# Patient Record
Sex: FEMALE | Race: WHITE | Employment: PART TIME | URBAN - METROPOLITAN AREA
[De-identification: names, ages, dates, MRNs, and addresses within clinical notes are randomized per-mention and may not be internally consistent; named-entity substitution may affect disease eponyms.]

---

## 2017-02-13 DIAGNOSIS — F51.01 PRIMARY INSOMNIA: ICD-10-CM

## 2017-02-13 RX ORDER — ZOLPIDEM TARTRATE 10 MG/1
TABLET ORAL
Qty: 30 TABLET | Refills: 0 | OUTPATIENT
Start: 2017-02-13 | End: 2017-03-17 | Stop reason: SDUPTHER

## 2017-03-17 ENCOUNTER — OFFICE VISIT (OUTPATIENT)
Dept: FAMILY MEDICINE CLINIC | Age: 62
End: 2017-03-17

## 2017-03-17 VITALS
HEART RATE: 90 BPM | SYSTOLIC BLOOD PRESSURE: 132 MMHG | TEMPERATURE: 97.7 F | WEIGHT: 139 LBS | DIASTOLIC BLOOD PRESSURE: 86 MMHG | BODY MASS INDEX: 24.63 KG/M2 | HEIGHT: 63 IN

## 2017-03-17 DIAGNOSIS — F51.01 PRIMARY INSOMNIA: ICD-10-CM

## 2017-03-17 DIAGNOSIS — I10 ESSENTIAL HYPERTENSION: Primary | ICD-10-CM

## 2017-03-17 DIAGNOSIS — G43.109 MIGRAINE WITH AURA AND WITHOUT STATUS MIGRAINOSUS, NOT INTRACTABLE: ICD-10-CM

## 2017-03-17 DIAGNOSIS — B00.9 HSV INFECTION: ICD-10-CM

## 2017-03-17 DIAGNOSIS — Z79.899 ENCOUNTER FOR LONG-TERM (CURRENT) USE OF MEDICATIONS: ICD-10-CM

## 2017-03-17 PROCEDURE — G8420 CALC BMI NORM PARAMETERS: HCPCS | Performed by: FAMILY MEDICINE

## 2017-03-17 PROCEDURE — 3017F COLORECTAL CA SCREEN DOC REV: CPT | Performed by: FAMILY MEDICINE

## 2017-03-17 PROCEDURE — G8484 FLU IMMUNIZE NO ADMIN: HCPCS | Performed by: FAMILY MEDICINE

## 2017-03-17 PROCEDURE — G8427 DOCREV CUR MEDS BY ELIG CLIN: HCPCS | Performed by: FAMILY MEDICINE

## 2017-03-17 PROCEDURE — 3014F SCREEN MAMMO DOC REV: CPT | Performed by: FAMILY MEDICINE

## 2017-03-17 PROCEDURE — 99214 OFFICE O/P EST MOD 30 MIN: CPT | Performed by: FAMILY MEDICINE

## 2017-03-17 PROCEDURE — 1036F TOBACCO NON-USER: CPT | Performed by: FAMILY MEDICINE

## 2017-03-17 RX ORDER — LISINOPRIL 10 MG/1
10 TABLET ORAL DAILY
Qty: 90 TABLET | Refills: 1 | Status: SHIPPED | OUTPATIENT
Start: 2017-03-17 | End: 2017-10-09 | Stop reason: SDUPTHER

## 2017-03-17 RX ORDER — ACYCLOVIR 400 MG/1
TABLET ORAL
Qty: 90 TABLET | Refills: 0 | Status: SHIPPED | OUTPATIENT
Start: 2017-03-17 | End: 2018-12-06 | Stop reason: SDUPTHER

## 2017-03-17 RX ORDER — ZOLPIDEM TARTRATE 10 MG/1
TABLET ORAL
Qty: 30 TABLET | Refills: 3 | Status: SHIPPED | OUTPATIENT
Start: 2017-03-17 | End: 2017-10-31 | Stop reason: ALTCHOICE

## 2017-03-17 RX ORDER — SUMATRIPTAN 100 MG/1
TABLET, FILM COATED ORAL
Qty: 27 TABLET | Refills: 5 | Status: SHIPPED | OUTPATIENT
Start: 2017-03-17 | End: 2017-10-31 | Stop reason: SDUPTHER

## 2017-10-09 ENCOUNTER — TELEPHONE (OUTPATIENT)
Dept: FAMILY MEDICINE CLINIC | Age: 62
End: 2017-10-09

## 2017-10-09 DIAGNOSIS — I10 ESSENTIAL HYPERTENSION: ICD-10-CM

## 2017-10-09 RX ORDER — LISINOPRIL 10 MG/1
TABLET ORAL
Qty: 30 TABLET | Refills: 0 | Status: SHIPPED | OUTPATIENT
Start: 2017-10-09 | End: 2017-10-31 | Stop reason: SDUPTHER

## 2017-10-09 NOTE — TELEPHONE ENCOUNTER
Last office visit 3/17/17    Last written 3/17/17    Next office visit scheduled Visit date not found    Requested Prescriptions     Pending Prescriptions Disp Refills    lisinopril (PRINIVIL;ZESTRIL) 10 MG tablet [Pharmacy Med Name: LISINOPRIL 10 MG TABLET] 30 tablet 0     Sig: TAKE ONE TABLET BY MOUTH DAILY

## 2017-10-12 ENCOUNTER — NURSE ONLY (OUTPATIENT)
Dept: FAMILY MEDICINE CLINIC | Age: 62
End: 2017-10-12

## 2017-10-12 DIAGNOSIS — I10 ESSENTIAL HYPERTENSION: ICD-10-CM

## 2017-10-12 DIAGNOSIS — I10 ESSENTIAL HYPERTENSION: Primary | ICD-10-CM

## 2017-10-12 LAB
A/G RATIO: 1.7 (ref 1.1–2.2)
ALBUMIN SERPL-MCNC: 4.4 G/DL (ref 3.4–5)
ALP BLD-CCNC: 81 U/L (ref 40–129)
ALT SERPL-CCNC: 24 U/L (ref 10–40)
ANION GAP SERPL CALCULATED.3IONS-SCNC: 15 MMOL/L (ref 3–16)
AST SERPL-CCNC: 30 U/L (ref 15–37)
BILIRUB SERPL-MCNC: 0.4 MG/DL (ref 0–1)
BUN BLDV-MCNC: 23 MG/DL (ref 7–20)
CALCIUM SERPL-MCNC: 9.7 MG/DL (ref 8.3–10.6)
CHLORIDE BLD-SCNC: 102 MMOL/L (ref 99–110)
CO2: 24 MMOL/L (ref 21–32)
CREAT SERPL-MCNC: 0.7 MG/DL (ref 0.6–1.2)
GFR AFRICAN AMERICAN: >60
GFR NON-AFRICAN AMERICAN: >60
GLOBULIN: 2.6 G/DL
GLUCOSE BLD-MCNC: 88 MG/DL (ref 70–99)
POTASSIUM SERPL-SCNC: 4.4 MMOL/L (ref 3.5–5.1)
SODIUM BLD-SCNC: 141 MMOL/L (ref 136–145)
TOTAL PROTEIN: 7 G/DL (ref 6.4–8.2)

## 2017-10-12 PROCEDURE — 36415 COLL VENOUS BLD VENIPUNCTURE: CPT | Performed by: FAMILY MEDICINE

## 2017-10-13 LAB
CHOLESTEROL, TOTAL: 208 MG/DL (ref 0–199)
ESTIMATED AVERAGE GLUCOSE: 99.7 MG/DL
HBA1C MFR BLD: 5.1 %
HDLC SERPL-MCNC: 68 MG/DL (ref 40–60)
LDL CHOLESTEROL CALCULATED: 121 MG/DL
TRIGL SERPL-MCNC: 96 MG/DL (ref 0–150)
VLDLC SERPL CALC-MCNC: 19 MG/DL

## 2017-10-31 ENCOUNTER — OFFICE VISIT (OUTPATIENT)
Dept: FAMILY MEDICINE CLINIC | Age: 62
End: 2017-10-31

## 2017-10-31 VITALS
HEIGHT: 63 IN | BODY MASS INDEX: 25.69 KG/M2 | DIASTOLIC BLOOD PRESSURE: 82 MMHG | SYSTOLIC BLOOD PRESSURE: 126 MMHG | OXYGEN SATURATION: 98 % | WEIGHT: 145 LBS | HEART RATE: 75 BPM

## 2017-10-31 DIAGNOSIS — I10 ESSENTIAL HYPERTENSION: Primary | ICD-10-CM

## 2017-10-31 DIAGNOSIS — Z00.00 PREVENTATIVE HEALTH CARE: ICD-10-CM

## 2017-10-31 DIAGNOSIS — G43.109 MIGRAINE WITH AURA AND WITHOUT STATUS MIGRAINOSUS, NOT INTRACTABLE: ICD-10-CM

## 2017-10-31 DIAGNOSIS — E78.01 LDL (LOW DENSITY LIPOPROTEIN RECEPTOR DISORDER): ICD-10-CM

## 2017-10-31 PROCEDURE — G8484 FLU IMMUNIZE NO ADMIN: HCPCS | Performed by: FAMILY MEDICINE

## 2017-10-31 PROCEDURE — 3014F SCREEN MAMMO DOC REV: CPT | Performed by: FAMILY MEDICINE

## 2017-10-31 PROCEDURE — 1036F TOBACCO NON-USER: CPT | Performed by: FAMILY MEDICINE

## 2017-10-31 PROCEDURE — G8419 CALC BMI OUT NRM PARAM NOF/U: HCPCS | Performed by: FAMILY MEDICINE

## 2017-10-31 PROCEDURE — 3017F COLORECTAL CA SCREEN DOC REV: CPT | Performed by: FAMILY MEDICINE

## 2017-10-31 PROCEDURE — G8427 DOCREV CUR MEDS BY ELIG CLIN: HCPCS | Performed by: FAMILY MEDICINE

## 2017-10-31 PROCEDURE — 99214 OFFICE O/P EST MOD 30 MIN: CPT | Performed by: FAMILY MEDICINE

## 2017-10-31 RX ORDER — LISINOPRIL 10 MG/1
TABLET ORAL
Qty: 30 TABLET | Refills: 4 | Status: SHIPPED | OUTPATIENT
Start: 2017-10-31 | End: 2018-04-14 | Stop reason: SDUPTHER

## 2017-10-31 RX ORDER — SUMATRIPTAN 100 MG/1
TABLET, FILM COATED ORAL
Qty: 9 TABLET | Refills: 5 | Status: SHIPPED | OUTPATIENT
Start: 2017-10-31 | End: 2018-06-18 | Stop reason: SDUPTHER

## 2017-10-31 RX ORDER — LANOLIN ALCOHOL/MO/W.PET/CERES
3 CREAM (GRAM) TOPICAL DAILY
COMMUNITY

## 2018-04-14 DIAGNOSIS — I10 ESSENTIAL HYPERTENSION: ICD-10-CM

## 2018-04-18 RX ORDER — LISINOPRIL 10 MG/1
TABLET ORAL
Qty: 30 TABLET | Refills: 0 | Status: SHIPPED | OUTPATIENT
Start: 2018-04-18 | End: 2018-05-18 | Stop reason: SDUPTHER

## 2018-06-07 ENCOUNTER — OFFICE VISIT (OUTPATIENT)
Dept: FAMILY MEDICINE CLINIC | Age: 63
End: 2018-06-07

## 2018-06-07 VITALS
OXYGEN SATURATION: 96 % | WEIGHT: 151 LBS | BODY MASS INDEX: 26.75 KG/M2 | HEART RATE: 86 BPM | DIASTOLIC BLOOD PRESSURE: 84 MMHG | SYSTOLIC BLOOD PRESSURE: 134 MMHG

## 2018-06-07 DIAGNOSIS — G43.109 MIGRAINE WITH AURA AND WITHOUT STATUS MIGRAINOSUS, NOT INTRACTABLE: ICD-10-CM

## 2018-06-07 DIAGNOSIS — I10 ESSENTIAL HYPERTENSION: Primary | ICD-10-CM

## 2018-06-07 DIAGNOSIS — F51.01 PRIMARY INSOMNIA: ICD-10-CM

## 2018-06-07 DIAGNOSIS — Z12.11 COLON CANCER SCREENING: ICD-10-CM

## 2018-06-07 DIAGNOSIS — Z12.39 BREAST CANCER SCREENING: ICD-10-CM

## 2018-06-07 PROCEDURE — 99214 OFFICE O/P EST MOD 30 MIN: CPT | Performed by: FAMILY MEDICINE

## 2018-06-07 PROCEDURE — 3017F COLORECTAL CA SCREEN DOC REV: CPT | Performed by: FAMILY MEDICINE

## 2018-06-07 PROCEDURE — G8419 CALC BMI OUT NRM PARAM NOF/U: HCPCS | Performed by: FAMILY MEDICINE

## 2018-06-07 PROCEDURE — G8427 DOCREV CUR MEDS BY ELIG CLIN: HCPCS | Performed by: FAMILY MEDICINE

## 2018-06-07 PROCEDURE — 1036F TOBACCO NON-USER: CPT | Performed by: FAMILY MEDICINE

## 2018-06-07 RX ORDER — LISINOPRIL 10 MG/1
TABLET ORAL
Qty: 30 TABLET | Refills: 2 | Status: SHIPPED | OUTPATIENT
Start: 2018-06-07 | End: 2018-09-17 | Stop reason: SDUPTHER

## 2018-06-07 ASSESSMENT — ENCOUNTER SYMPTOMS: SHORTNESS OF BREATH: 0

## 2018-06-18 DIAGNOSIS — G43.109 MIGRAINE WITH AURA AND WITHOUT STATUS MIGRAINOSUS, NOT INTRACTABLE: ICD-10-CM

## 2018-06-19 RX ORDER — SUMATRIPTAN 100 MG/1
TABLET, FILM COATED ORAL
Qty: 9 TABLET | Refills: 5 | Status: SHIPPED | OUTPATIENT
Start: 2018-06-19 | End: 2020-01-27

## 2018-08-08 ENCOUNTER — TELEPHONE (OUTPATIENT)
Dept: FAMILY MEDICINE CLINIC | Age: 63
End: 2018-08-08

## 2018-08-09 ENCOUNTER — TELEPHONE (OUTPATIENT)
Dept: FAMILY MEDICINE CLINIC | Age: 63
End: 2018-08-09

## 2018-08-27 ENCOUNTER — TELEPHONE (OUTPATIENT)
Dept: FAMILY MEDICINE CLINIC | Age: 63
End: 2018-08-27

## 2018-08-27 NOTE — TELEPHONE ENCOUNTER
Patient filled out a Cologuard Order form to be filled out by provider and faxed to [employer] . She states her insurance will pay for the test and she prefers to do this instead of the Fit Test. Patient would like to be informed if form was able to be filled out and faxed in.  Form was placed in Dr Micheal Guerrero folder

## 2018-08-31 ENCOUNTER — TELEPHONE (OUTPATIENT)
Dept: FAMILY MEDICINE CLINIC | Age: 63
End: 2018-08-31

## 2018-09-10 ENCOUNTER — TELEPHONE (OUTPATIENT)
Dept: FAMILY MEDICINE CLINIC | Age: 63
End: 2018-09-10

## 2018-09-13 ENCOUNTER — TELEPHONE (OUTPATIENT)
Dept: FAMILY MEDICINE CLINIC | Age: 63
End: 2018-09-13

## 2018-09-14 NOTE — TELEPHONE ENCOUNTER
Left detailed message letting her know the test was negative, and to call for any concerns or questions.

## 2018-09-17 DIAGNOSIS — I10 ESSENTIAL HYPERTENSION: ICD-10-CM

## 2018-09-17 RX ORDER — LISINOPRIL 10 MG/1
TABLET ORAL
Qty: 30 TABLET | Refills: 1 | Status: SHIPPED | OUTPATIENT
Start: 2018-09-17 | End: 2018-11-12 | Stop reason: SDUPTHER

## 2018-09-17 NOTE — TELEPHONE ENCOUNTER
Patient called and stated that her pharmacy would be sending over a refill request for her Lisinopril 10 mgs. Patient states that she is completely out of the medication and does not having any for todays dose. Patient states that she is also leaving tomorrow to go out of town and will be gone until Friday.     Last filled   06.07.18  Last seen  06.07.18  Next appt   Not scheduled

## 2018-10-08 ENCOUNTER — HOSPITAL ENCOUNTER (OUTPATIENT)
Dept: WOMENS IMAGING | Age: 63
Discharge: HOME | End: 2018-10-08
Payer: COMMERCIAL

## 2018-10-08 DIAGNOSIS — Z12.31 VISIT FOR SCREENING MAMMOGRAM: ICD-10-CM

## 2018-10-08 PROCEDURE — 77063 BREAST TOMOSYNTHESIS BI: CPT

## 2018-10-15 ENCOUNTER — OFFICE VISIT (OUTPATIENT)
Dept: FAMILY MEDICINE CLINIC | Age: 63
End: 2018-10-15
Payer: COMMERCIAL

## 2018-10-15 VITALS
HEART RATE: 68 BPM | WEIGHT: 150 LBS | BODY MASS INDEX: 26.57 KG/M2 | SYSTOLIC BLOOD PRESSURE: 138 MMHG | TEMPERATURE: 98 F | DIASTOLIC BLOOD PRESSURE: 80 MMHG

## 2018-10-15 DIAGNOSIS — H01.116 EYELID DERMATITIS, ALLERGIC/CONTACT, LEFT: Primary | ICD-10-CM

## 2018-10-15 PROCEDURE — 99213 OFFICE O/P EST LOW 20 MIN: CPT | Performed by: NURSE PRACTITIONER

## 2018-10-15 ASSESSMENT — ENCOUNTER SYMPTOMS
COLOR CHANGE: 1
PHOTOPHOBIA: 0
EYE ITCHING: 1
EYE PAIN: 0
RESPIRATORY NEGATIVE: 1
EYE DISCHARGE: 1
EYE REDNESS: 0

## 2018-10-15 NOTE — PATIENT INSTRUCTIONS
Use allergy eye drops and you can try saline solution for the eyes. Try 0.5 %hydrocortisone cream on top the eye lid in a thin layer. Also use daily moisture cream on face and eye lids.

## 2018-10-26 ENCOUNTER — OFFICE VISIT (OUTPATIENT)
Dept: FAMILY MEDICINE CLINIC | Age: 63
End: 2018-10-26
Payer: COMMERCIAL

## 2018-10-26 VITALS
SYSTOLIC BLOOD PRESSURE: 128 MMHG | OXYGEN SATURATION: 97 % | WEIGHT: 150 LBS | DIASTOLIC BLOOD PRESSURE: 80 MMHG | BODY MASS INDEX: 26.57 KG/M2 | HEART RATE: 88 BPM

## 2018-10-26 DIAGNOSIS — H01.004 BLEPHARITIS OF LEFT UPPER EYELID, UNSPECIFIED TYPE: Primary | ICD-10-CM

## 2018-10-26 PROCEDURE — 3017F COLORECTAL CA SCREEN DOC REV: CPT | Performed by: NURSE PRACTITIONER

## 2018-10-26 PROCEDURE — G8427 DOCREV CUR MEDS BY ELIG CLIN: HCPCS | Performed by: NURSE PRACTITIONER

## 2018-10-26 PROCEDURE — G8419 CALC BMI OUT NRM PARAM NOF/U: HCPCS | Performed by: NURSE PRACTITIONER

## 2018-10-26 PROCEDURE — 1036F TOBACCO NON-USER: CPT | Performed by: NURSE PRACTITIONER

## 2018-10-26 PROCEDURE — 99213 OFFICE O/P EST LOW 20 MIN: CPT | Performed by: NURSE PRACTITIONER

## 2018-10-26 PROCEDURE — G8484 FLU IMMUNIZE NO ADMIN: HCPCS | Performed by: NURSE PRACTITIONER

## 2018-10-26 ASSESSMENT — ENCOUNTER SYMPTOMS
EYE DISCHARGE: 1
BLURRED VISION: 1
DOUBLE VISION: 0
EYE ITCHING: 1
NAUSEA: 0
EYE REDNESS: 1

## 2018-11-12 DIAGNOSIS — I10 ESSENTIAL HYPERTENSION: ICD-10-CM

## 2018-11-12 RX ORDER — LISINOPRIL 10 MG/1
TABLET ORAL
Qty: 30 TABLET | Refills: 0 | Status: SHIPPED | OUTPATIENT
Start: 2018-11-12 | End: 2018-12-06 | Stop reason: SDUPTHER

## 2018-12-06 ENCOUNTER — OFFICE VISIT (OUTPATIENT)
Dept: FAMILY MEDICINE CLINIC | Age: 63
End: 2018-12-06
Payer: COMMERCIAL

## 2018-12-06 VITALS
DIASTOLIC BLOOD PRESSURE: 80 MMHG | SYSTOLIC BLOOD PRESSURE: 126 MMHG | WEIGHT: 149 LBS | HEART RATE: 68 BPM | BODY MASS INDEX: 26.39 KG/M2 | OXYGEN SATURATION: 98 %

## 2018-12-06 DIAGNOSIS — I10 ESSENTIAL HYPERTENSION: ICD-10-CM

## 2018-12-06 DIAGNOSIS — Z87.891 FORMER SMOKER: ICD-10-CM

## 2018-12-06 DIAGNOSIS — N39.0 CHRONIC UTI (URINARY TRACT INFECTION): ICD-10-CM

## 2018-12-06 DIAGNOSIS — Z00.00 GENERAL MEDICAL EXAM: Primary | ICD-10-CM

## 2018-12-06 DIAGNOSIS — B00.9 HSV INFECTION: ICD-10-CM

## 2018-12-06 LAB
A/G RATIO: 2.1 (ref 1.1–2.2)
ALBUMIN SERPL-MCNC: 4.7 G/DL (ref 3.4–5)
ALP BLD-CCNC: 85 U/L (ref 40–129)
ALT SERPL-CCNC: 22 U/L (ref 10–40)
ANION GAP SERPL CALCULATED.3IONS-SCNC: 13 MMOL/L (ref 3–16)
AST SERPL-CCNC: 28 U/L (ref 15–37)
BASOPHILS ABSOLUTE: 0 K/UL (ref 0–0.2)
BASOPHILS RELATIVE PERCENT: 0.6 %
BILIRUB SERPL-MCNC: 0.6 MG/DL (ref 0–1)
BUN BLDV-MCNC: 15 MG/DL (ref 7–20)
CALCIUM SERPL-MCNC: 9.7 MG/DL (ref 8.3–10.6)
CHLORIDE BLD-SCNC: 102 MMOL/L (ref 99–110)
CHOLESTEROL, TOTAL: 200 MG/DL (ref 0–199)
CO2: 26 MMOL/L (ref 21–32)
CREAT SERPL-MCNC: 0.8 MG/DL (ref 0.6–1.2)
EOSINOPHILS ABSOLUTE: 0.2 K/UL (ref 0–0.6)
EOSINOPHILS RELATIVE PERCENT: 4.4 %
GFR AFRICAN AMERICAN: >60
GFR NON-AFRICAN AMERICAN: >60
GLOBULIN: 2.2 G/DL
GLUCOSE BLD-MCNC: 87 MG/DL (ref 70–99)
HCT VFR BLD CALC: 41.5 % (ref 36–48)
HDLC SERPL-MCNC: 66 MG/DL (ref 40–60)
HEMOGLOBIN: 14.1 G/DL (ref 12–16)
LDL CHOLESTEROL CALCULATED: 120 MG/DL
LYMPHOCYTES ABSOLUTE: 1.6 K/UL (ref 1–5.1)
LYMPHOCYTES RELATIVE PERCENT: 33.3 %
MCH RBC QN AUTO: 32.5 PG (ref 26–34)
MCHC RBC AUTO-ENTMCNC: 34 G/DL (ref 31–36)
MCV RBC AUTO: 95.5 FL (ref 80–100)
MONOCYTES ABSOLUTE: 0.4 K/UL (ref 0–1.3)
MONOCYTES RELATIVE PERCENT: 8.5 %
NEUTROPHILS ABSOLUTE: 2.5 K/UL (ref 1.7–7.7)
NEUTROPHILS RELATIVE PERCENT: 53.2 %
PDW BLD-RTO: 12.9 % (ref 12.4–15.4)
PLATELET # BLD: 246 K/UL (ref 135–450)
PMV BLD AUTO: 8.4 FL (ref 5–10.5)
POTASSIUM SERPL-SCNC: 4.8 MMOL/L (ref 3.5–5.1)
RBC # BLD: 4.34 M/UL (ref 4–5.2)
SODIUM BLD-SCNC: 141 MMOL/L (ref 136–145)
TOTAL PROTEIN: 6.9 G/DL (ref 6.4–8.2)
TRIGL SERPL-MCNC: 70 MG/DL (ref 0–150)
TSH SERPL DL<=0.05 MIU/L-ACNC: 3.22 UIU/ML (ref 0.27–4.2)
VLDLC SERPL CALC-MCNC: 14 MG/DL
WBC # BLD: 4.7 K/UL (ref 4–11)

## 2018-12-06 PROCEDURE — 99396 PREV VISIT EST AGE 40-64: CPT | Performed by: NURSE PRACTITIONER

## 2018-12-06 PROCEDURE — G8484 FLU IMMUNIZE NO ADMIN: HCPCS | Performed by: NURSE PRACTITIONER

## 2018-12-06 PROCEDURE — 36415 COLL VENOUS BLD VENIPUNCTURE: CPT | Performed by: NURSE PRACTITIONER

## 2018-12-06 RX ORDER — NITROFURANTOIN 25; 75 MG/1; MG/1
CAPSULE ORAL
Qty: 30 CAPSULE | Refills: 0 | Status: SHIPPED | OUTPATIENT
Start: 2018-12-06 | End: 2020-04-15

## 2018-12-06 RX ORDER — LISINOPRIL 10 MG/1
10 TABLET ORAL DAILY
Qty: 30 TABLET | Refills: 4 | Status: SHIPPED | OUTPATIENT
Start: 2018-12-06 | End: 2019-04-29 | Stop reason: SDUPTHER

## 2018-12-06 RX ORDER — ACYCLOVIR 400 MG/1
TABLET ORAL
Qty: 90 TABLET | Refills: 0 | Status: SHIPPED | OUTPATIENT
Start: 2018-12-06 | End: 2022-05-13 | Stop reason: SDUPTHER

## 2018-12-06 ASSESSMENT — ENCOUNTER SYMPTOMS
NAUSEA: 0
EYE DISCHARGE: 0
BACK PAIN: 0
SINUS PAIN: 0
ABDOMINAL PAIN: 0
SHORTNESS OF BREATH: 0
CONSTIPATION: 0
SORE THROAT: 0
TROUBLE SWALLOWING: 0
SINUS PRESSURE: 0
DIARRHEA: 0
CHEST TIGHTNESS: 0
BLURRED VISION: 0
COUGH: 0
COLOR CHANGE: 0

## 2018-12-06 ASSESSMENT — PATIENT HEALTH QUESTIONNAIRE - PHQ9
SUM OF ALL RESPONSES TO PHQ QUESTIONS 1-9: 0
1. LITTLE INTEREST OR PLEASURE IN DOING THINGS: 0
SUM OF ALL RESPONSES TO PHQ QUESTIONS 1-9: 0
SUM OF ALL RESPONSES TO PHQ9 QUESTIONS 1 & 2: 0
2. FEELING DOWN, DEPRESSED OR HOPELESS: 0

## 2018-12-06 NOTE — PROGRESS NOTES
this note. --Manny Walker  [facility], FNP-BC on 12/6/2018 at9:06 AM
note.    --Lauren Garcia.  Fei,BEBETO-BC on 12/6/2018 at 9:23 AM

## 2018-12-07 LAB
ESTIMATED AVERAGE GLUCOSE: 99.7 MG/DL
HBA1C MFR BLD: 5.1 %

## 2018-12-17 ENCOUNTER — TELEPHONE (OUTPATIENT)
Dept: CASE MANAGEMENT | Age: 63
End: 2018-12-17

## 2018-12-28 ENCOUNTER — HOSPITAL ENCOUNTER (OUTPATIENT)
Dept: CT IMAGING | Age: 63
Discharge: HOME | End: 2018-12-28
Payer: COMMERCIAL

## 2018-12-28 DIAGNOSIS — Z87.891 FORMER SMOKER: ICD-10-CM

## 2018-12-28 PROCEDURE — G0297 LDCT FOR LUNG CA SCREEN: HCPCS

## 2019-04-29 DIAGNOSIS — I10 ESSENTIAL HYPERTENSION: ICD-10-CM

## 2019-04-29 RX ORDER — LISINOPRIL 10 MG/1
TABLET ORAL
Qty: 30 TABLET | Refills: 3 | Status: SHIPPED | OUTPATIENT
Start: 2019-04-29 | End: 2019-07-30 | Stop reason: SDUPTHER

## 2019-07-30 ENCOUNTER — OFFICE VISIT (OUTPATIENT)
Dept: FAMILY MEDICINE CLINIC | Age: 64
End: 2019-07-30

## 2019-07-30 VITALS
SYSTOLIC BLOOD PRESSURE: 132 MMHG | OXYGEN SATURATION: 100 % | WEIGHT: 154.8 LBS | DIASTOLIC BLOOD PRESSURE: 84 MMHG | TEMPERATURE: 98.8 F | HEART RATE: 74 BPM | BODY MASS INDEX: 27.42 KG/M2

## 2019-07-30 DIAGNOSIS — I10 ESSENTIAL HYPERTENSION: ICD-10-CM

## 2019-07-30 PROCEDURE — 99211 OFF/OP EST MAY X REQ PHY/QHP: CPT | Performed by: NURSE PRACTITIONER

## 2019-07-30 RX ORDER — LISINOPRIL 10 MG/1
TABLET ORAL
Qty: 90 TABLET | Refills: 1 | Status: SHIPPED | OUTPATIENT
Start: 2019-07-30 | End: 2020-02-24

## 2019-07-30 ASSESSMENT — ENCOUNTER SYMPTOMS
BACK PAIN: 0
COUGH: 0
SHORTNESS OF BREATH: 0
COLOR CHANGE: 0
SINUS PAIN: 0
TROUBLE SWALLOWING: 0
NAUSEA: 0
SORE THROAT: 0
CHEST TIGHTNESS: 0
EYE DISCHARGE: 0
SINUS PRESSURE: 0
ABDOMINAL PAIN: 0
BLURRED VISION: 0

## 2019-07-30 NOTE — PROGRESS NOTES
2019     Emily Mijares (:  1955) is a 62 y.o. female, here for evaluation of the following medical concerns: She is here today for medication refill of blood pressure medication. BP has been good at home. No headaches, no blurry vision.  twice a week. Going to [facility] in 2019. Hypertension   This is a chronic problem. The current episode started more than 1 year ago. The problem is unchanged. The problem is controlled. Pertinent negatives include no anxiety, blurred vision, chest pain, headaches, malaise/fatigue, palpitations, peripheral edema, shortness of breath or sweats. Treatments tried: lisinopril 10 mg. The current treatment provides significant improvement. There are no compliance problems. Review of Systems   Constitutional: Negative for activity change, appetite change, chills, diaphoresis, fatigue, fever and malaise/fatigue. HENT: Negative for congestion, ear pain, postnasal drip, sinus pressure, sinus pain, sore throat and trouble swallowing. Eyes: Negative for blurred vision and discharge. Respiratory: Negative for cough, chest tightness and shortness of breath. Cardiovascular: Negative for chest pain and palpitations. Gastrointestinal: Negative for abdominal pain and nausea. Endocrine: Negative for cold intolerance, heat intolerance and polyuria. Musculoskeletal: Negative for back pain and gait problem. Skin: Negative for color change and rash. Allergic/Immunologic: Negative for environmental allergies, food allergies and immunocompromised state. Neurological: Negative for dizziness, weakness and headaches. Hematological: Does not bruise/bleed easily. Psychiatric/Behavioral: Negative for confusion and sleep disturbance. The patient is not nervous/anxious. Prior to Visit Medications    Medication Sig Taking?  Authorizing Provider   lisinopril (PRINIVIL;ZESTRIL) 10 MG tablet TAKE ONE TABLET BY MOUTH DAILY Yes Callie Loving Cardiovascular: Normal rate, regular rhythm and normal heart sounds. Pulmonary/Chest: Effort normal and breath sounds normal.   Abdominal: Soft. Musculoskeletal: Normal range of motion. She exhibits no deformity. Neurological: She is alert and oriented to person, place, and time. Skin: Skin is warm and dry. She is not diaphoretic. Psychiatric: She has a normal mood and affect. Her behavior is normal. Judgment and thought content normal.   Vitals reviewed. ASSESSMENT/PLAN:  Rema Sanford was seen today for blood pressure check. Diagnoses and all orders for this visit:    Essential hypertension  -     lisinopril (PRINIVIL;ZESTRIL) 10 MG tablet; TAKE ONE TABLET BY MOUTH DAILY    Return in about 6 months (around 1/30/2020). An electronic signature was used to authenticate this note. --FALGUNI Dai on 7/30/2019 at3:05 PM

## 2020-02-26 RX ORDER — LISINOPRIL 10 MG/1
TABLET ORAL
Qty: 30 TABLET | Refills: 0 | OUTPATIENT
Start: 2020-02-26

## 2020-03-18 ENCOUNTER — TELEPHONE (OUTPATIENT)
Dept: FAMILY MEDICINE CLINIC | Age: 65
End: 2020-03-18

## 2020-03-18 NOTE — TELEPHONE ENCOUNTER
Patient stated she was told she needed seen ASAP but does not want to come in now because of virus, she rescheduled for April 15th.      Patient wants a refill called in for lisinopril (PRINIVIL;ZESTRIL) 10 MG tablet    Send to [facility]

## 2020-03-19 RX ORDER — LISINOPRIL 10 MG/1
TABLET ORAL
Qty: 30 TABLET | Refills: 0 | Status: SHIPPED | OUTPATIENT
Start: 2020-03-19 | End: 2020-04-27

## 2020-04-15 ENCOUNTER — VIRTUAL VISIT (OUTPATIENT)
Dept: FAMILY MEDICINE CLINIC | Age: 65
End: 2020-04-15

## 2020-04-15 PROCEDURE — 99213 OFFICE O/P EST LOW 20 MIN: CPT | Performed by: FAMILY MEDICINE

## 2020-04-15 NOTE — PROGRESS NOTES
4/15/2020    TELEHEALTH EVALUATION -- Audio/Visual (During IQXSU-11 public health emergency)    HPI:    Rosey Carrera (:  1955) has requested an audio/video evaluation for the following concern(s):    Hx of HTN on lisinopril. Reports compliance without medication SE. Monitors her BP at home and it was 128/81  Hx of insomnia takes melatonin 3mg with good response. Hx of migraine takes imitrex prn doing will with the medication. Symptoms are usually triggered by changes in the weather. Gets 4 migraines per year    Review of Systems    Prior to Visit Medications    Medication Sig Taking? Authorizing Provider   lisinopril (PRINIVIL;ZESTRIL) 10 MG tablet TAKE ONE TABLET BY MOUTH DAILY  Dieter Neff MD   SUMAtriptan (IMITREX) 100 MG tablet TAKE ONE TABLET BY MOUTH ONCE AS NEEDED FOR MIGRAINE  Dieter Neff MD   acyclovir (ZOVIRAX) 400 MG tablet TAKE 1 TABLET BY MOUTH FIVE TIMES DAILY  SENAIT Vela - CNP   melatonin 3 MG TABS tablet Take 3 mg by mouth daily  Historical Provider, MD   Calcium Citrate 150 MG CAPS Take by mouth  Historical Provider, MD   Multiple Vitamin (MULTIVITAMIN PO) Take  by mouth daily. Historical Provider, MD       Social History     Tobacco Use    Smoking status: Former Smoker     Packs/day: 0.75     Years: 15.00     Pack years: 11.25     Last attempt to quit: 1990     Years since quittin.6    Smokeless tobacco: Never Used   Substance Use Topics    Alcohol use:  Yes    Drug use: No        Allergies   Allergen Reactions    Hctz Other (See Comments)     Low potassium       PHYSICAL EXAMINATION:  [ INSTRUCTIONS:  \"[x]\" Indicates a positive item  \"[]\" Indicates a negative item  -- DELETE ALL ITEMS NOT EXAMINED]  Vital Signs: (As obtained by patient/caregiver or practitioner observation)    Blood pressure-  Heart rate-    Respiratory rate-    Temperature-  Pulse oximetry-     Constitutional: [x] Appears well-developed and well-nourished [x] No apparent

## 2020-07-13 ENCOUNTER — TELEPHONE (OUTPATIENT)
Dept: FAMILY MEDICINE CLINIC | Age: 65
End: 2020-07-13

## 2020-08-17 ENCOUNTER — HOSPITAL ENCOUNTER (OUTPATIENT)
Dept: WOMENS IMAGING | Age: 65
Discharge: HOME | End: 2020-08-17
Payer: COMMERCIAL

## 2020-08-17 PROCEDURE — 77067 SCR MAMMO BI INCL CAD: CPT

## 2020-09-17 DIAGNOSIS — Z13.220 LIPID SCREENING: ICD-10-CM

## 2020-09-17 DIAGNOSIS — I10 ESSENTIAL HYPERTENSION: ICD-10-CM

## 2020-09-17 LAB
A/G RATIO: 2 (ref 1.1–2.2)
ALBUMIN SERPL-MCNC: 4.4 G/DL (ref 3.4–5)
ALP BLD-CCNC: 73 U/L (ref 40–129)
ALT SERPL-CCNC: 25 U/L (ref 10–40)
ANION GAP SERPL CALCULATED.3IONS-SCNC: 8 MMOL/L (ref 3–16)
AST SERPL-CCNC: 28 U/L (ref 15–37)
BILIRUB SERPL-MCNC: 0.5 MG/DL (ref 0–1)
BUN BLDV-MCNC: 21 MG/DL (ref 7–20)
CALCIUM SERPL-MCNC: 9.6 MG/DL (ref 8.3–10.6)
CHLORIDE BLD-SCNC: 106 MMOL/L (ref 99–110)
CHOLESTEROL, TOTAL: 208 MG/DL (ref 0–199)
CO2: 27 MMOL/L (ref 21–32)
CREAT SERPL-MCNC: 0.7 MG/DL (ref 0.6–1.2)
GFR AFRICAN AMERICAN: >60
GFR NON-AFRICAN AMERICAN: >60
GLOBULIN: 2.2 G/DL
GLUCOSE BLD-MCNC: 95 MG/DL (ref 70–99)
HDLC SERPL-MCNC: 66 MG/DL (ref 40–60)
LDL CHOLESTEROL CALCULATED: 127 MG/DL
POTASSIUM SERPL-SCNC: 4.6 MMOL/L (ref 3.5–5.1)
SODIUM BLD-SCNC: 141 MMOL/L (ref 136–145)
TOTAL PROTEIN: 6.6 G/DL (ref 6.4–8.2)
TRIGL SERPL-MCNC: 73 MG/DL (ref 0–150)
VLDLC SERPL CALC-MCNC: 15 MG/DL

## 2020-09-25 ENCOUNTER — TELEPHONE (OUTPATIENT)
Dept: FAMILY MEDICINE CLINIC | Age: 65
End: 2020-09-25

## 2020-11-02 ENCOUNTER — OFFICE VISIT (OUTPATIENT)
Dept: FAMILY MEDICINE CLINIC | Age: 65
End: 2020-11-02

## 2020-11-02 VITALS
TEMPERATURE: 97.1 F | DIASTOLIC BLOOD PRESSURE: 86 MMHG | SYSTOLIC BLOOD PRESSURE: 128 MMHG | WEIGHT: 154.6 LBS | HEART RATE: 85 BPM | OXYGEN SATURATION: 98 % | HEIGHT: 63 IN | BODY MASS INDEX: 27.39 KG/M2

## 2020-11-02 PROCEDURE — 99213 OFFICE O/P EST LOW 20 MIN: CPT | Performed by: PHYSICIAN ASSISTANT

## 2020-11-02 RX ORDER — LISINOPRIL 10 MG/1
TABLET ORAL
Qty: 30 TABLET | Refills: 2 | Status: SHIPPED | OUTPATIENT
Start: 2020-11-02 | End: 2021-03-08 | Stop reason: SDUPTHER

## 2020-11-02 ASSESSMENT — PATIENT HEALTH QUESTIONNAIRE - PHQ9
SUM OF ALL RESPONSES TO PHQ QUESTIONS 1-9: 0
2. FEELING DOWN, DEPRESSED OR HOPELESS: 0
SUM OF ALL RESPONSES TO PHQ QUESTIONS 1-9: 0
SUM OF ALL RESPONSES TO PHQ QUESTIONS 1-9: 0
1. LITTLE INTEREST OR PLEASURE IN DOING THINGS: 0
SUM OF ALL RESPONSES TO PHQ9 QUESTIONS 1 & 2: 0

## 2021-03-08 DIAGNOSIS — I10 ESSENTIAL HYPERTENSION: ICD-10-CM

## 2021-03-08 RX ORDER — LISINOPRIL 10 MG/1
TABLET ORAL
Qty: 30 TABLET | Refills: 2 | Status: SHIPPED | OUTPATIENT
Start: 2021-03-08 | End: 2021-05-03 | Stop reason: SDUPTHER

## 2021-03-08 NOTE — TELEPHONE ENCOUNTER
Raul Sherrill [phone] (home)    is requesting refill(s) of medication [facility] (pertaining to medication)   Last refill 11/2/20 (per medication requested)  Next office visit scheduled or attempted Yes  Date 5/3/21  If No, reason

## 2021-03-24 ENCOUNTER — IMMUNIZATION (OUTPATIENT)
Dept: PRIMARY CARE CLINIC | Age: 66
End: 2021-03-24
Payer: MEDICARE

## 2021-03-24 PROCEDURE — 91301 COVID-19, MODERNA VACCINE 100MCG/0.5ML DOSE: CPT | Performed by: FAMILY MEDICINE

## 2021-03-24 PROCEDURE — 0011A COVID-19, MODERNA VACCINE 100MCG/0.5ML DOSE: CPT | Performed by: FAMILY MEDICINE

## 2021-04-21 ENCOUNTER — IMMUNIZATION (OUTPATIENT)
Dept: PRIMARY CARE CLINIC | Age: 66
End: 2021-04-21
Payer: MEDICARE

## 2021-04-21 PROCEDURE — 0012A COVID-19, MODERNA VACCINE 100MCG/0.5ML DOSE: CPT

## 2021-04-21 PROCEDURE — 91301 COVID-19, MODERNA VACCINE 100MCG/0.5ML DOSE: CPT

## 2021-05-03 ENCOUNTER — OFFICE VISIT (OUTPATIENT)
Dept: FAMILY MEDICINE CLINIC | Age: 66
End: 2021-05-03
Payer: MEDICARE

## 2021-05-03 VITALS
HEIGHT: 63 IN | OXYGEN SATURATION: 98 % | BODY MASS INDEX: 26.4 KG/M2 | DIASTOLIC BLOOD PRESSURE: 72 MMHG | WEIGHT: 149 LBS | HEART RATE: 83 BPM | SYSTOLIC BLOOD PRESSURE: 138 MMHG

## 2021-05-03 DIAGNOSIS — Z78.0 POST-MENOPAUSAL: ICD-10-CM

## 2021-05-03 DIAGNOSIS — I10 ESSENTIAL HYPERTENSION: Primary | ICD-10-CM

## 2021-05-03 PROCEDURE — 99213 OFFICE O/P EST LOW 20 MIN: CPT | Performed by: PHYSICIAN ASSISTANT

## 2021-05-03 RX ORDER — LISINOPRIL 10 MG/1
TABLET ORAL
Qty: 90 TABLET | Refills: 1 | Status: SHIPPED | OUTPATIENT
Start: 2021-05-03 | End: 2021-11-05 | Stop reason: SDUPTHER

## 2021-05-03 ASSESSMENT — PATIENT HEALTH QUESTIONNAIRE - PHQ9
SUM OF ALL RESPONSES TO PHQ QUESTIONS 1-9: 0
SUM OF ALL RESPONSES TO PHQ9 QUESTIONS 1 & 2: 0
SUM OF ALL RESPONSES TO PHQ QUESTIONS 1-9: 0
SUM OF ALL RESPONSES TO PHQ QUESTIONS 1-9: 0

## 2021-05-03 NOTE — PROGRESS NOTES
Subjective:   Ananda Greenberg is a 73 y.o. adult with hypertension. Current Outpatient Medications   Medication Sig Dispense Refill    lisinopril (PRINIVIL;ZESTRIL) 10 MG tablet TAKE ONE TABLET BY MOUTH DAILY 30 tablet 2    melatonin 3 MG TABS tablet Take 3 mg by mouth daily      Calcium Citrate 150 MG CAPS Take by mouth      Multiple Vitamin (MULTIVITAMIN PO) Take  by mouth daily.  SUMAtriptan (IMITREX) 100 MG tablet TAKE ONE TABLET BY MOUTH ONCE AS NEEDED FOR MIGRAINE (Patient not taking: Reported on 5/3/2021) 9 tablet 0    acyclovir (ZOVIRAX) 400 MG tablet TAKE 1 TABLET BY MOUTH FIVE TIMES DAILY (Patient not taking: Reported on 11/2/2020) 90 tablet 0     No current facility-administered medications for this visit. Hypertension ROS: taking medications as instructed, no medication side effects noted, no TIA's, no chest pain on exertion, no dyspnea on exertion, no swelling of ankles. New concerns: none today. Objective:   /72 (Site: Right Upper Arm, Position: Sitting)   Pulse 83   Ht 5' 3\" (1.6 m)   Wt 149 lb (67.6 kg)   LMP  (LMP Unknown)   SpO2 98%   BMI 26.39 kg/m²    Appearance alert, well appearing, and in no distress and oriented to person, place, and time. General exam BP noted to be well controlled today in office, S1, S2 normal, no gallop, no murmur, chest clear, no JVD, no HSM, no edema. Lab review: labs are reviewed, up to date and normal.     Assessment:     Diagnosis Orders   1. Essential hypertension  lisinopril (PRINIVIL;ZESTRIL) 10 MG tablet   2. Post-menopausal  DEXA BONE DENSITY AXIAL SKELETON         Plan:   Current treatment plan is effective, no change in therapy. .    Sue Bautista, will request results

## 2021-06-09 ENCOUNTER — HOSPITAL ENCOUNTER (OUTPATIENT)
Dept: WOMENS IMAGING | Age: 66
Discharge: HOME | End: 2021-06-09
Payer: MEDICARE

## 2021-06-09 DIAGNOSIS — Z78.0 POST-MENOPAUSAL: ICD-10-CM

## 2021-06-09 PROCEDURE — 77080 DXA BONE DENSITY AXIAL: CPT

## 2021-11-05 ENCOUNTER — OFFICE VISIT (OUTPATIENT)
Dept: FAMILY MEDICINE CLINIC | Age: 66
End: 2021-11-05
Payer: MEDICARE

## 2021-11-05 VITALS
SYSTOLIC BLOOD PRESSURE: 138 MMHG | DIASTOLIC BLOOD PRESSURE: 84 MMHG | HEIGHT: 63 IN | OXYGEN SATURATION: 98 % | BODY MASS INDEX: 26.33 KG/M2 | WEIGHT: 148.6 LBS | HEART RATE: 89 BPM

## 2021-11-05 DIAGNOSIS — Z12.11 COLON CANCER SCREENING: ICD-10-CM

## 2021-11-05 DIAGNOSIS — Z23 NEED FOR INFLUENZA VACCINATION: ICD-10-CM

## 2021-11-05 DIAGNOSIS — I10 ESSENTIAL HYPERTENSION: ICD-10-CM

## 2021-11-05 DIAGNOSIS — Z00.00 ROUTINE GENERAL MEDICAL EXAMINATION AT A HEALTH CARE FACILITY: Primary | ICD-10-CM

## 2021-11-05 PROCEDURE — 90686 IIV4 VACC NO PRSV 0.5 ML IM: CPT | Performed by: PHYSICIAN ASSISTANT

## 2021-11-05 PROCEDURE — 90471 IMMUNIZATION ADMIN: CPT | Performed by: PHYSICIAN ASSISTANT

## 2021-11-05 PROCEDURE — G0008 ADMIN INFLUENZA VIRUS VAC: HCPCS | Performed by: PHYSICIAN ASSISTANT

## 2021-11-05 PROCEDURE — 90715 TDAP VACCINE 7 YRS/> IM: CPT | Performed by: PHYSICIAN ASSISTANT

## 2021-11-05 PROCEDURE — G0402 INITIAL PREVENTIVE EXAM: HCPCS | Performed by: PHYSICIAN ASSISTANT

## 2021-11-05 RX ORDER — LISINOPRIL 10 MG/1
TABLET ORAL
Qty: 90 TABLET | Refills: 1 | Status: SHIPPED | OUTPATIENT
Start: 2021-11-05 | End: 2022-05-13 | Stop reason: SDUPTHER

## 2021-11-05 ASSESSMENT — LIFESTYLE VARIABLES
AUDIT-C TOTAL SCORE: 4
HOW OFTEN DURING THE LAST YEAR HAVE YOU FAILED TO DO WHAT WAS NORMALLY EXPECTED FROM YOU BECAUSE OF DRINKING: 0
HOW OFTEN DO YOU HAVE SIX OR MORE DRINKS ON ONE OCCASION: 0
AUDIT TOTAL SCORE: 4
HOW OFTEN DURING THE LAST YEAR HAVE YOU HAD A FEELING OF GUILT OR REMORSE AFTER DRINKING: 0
HOW OFTEN DURING THE LAST YEAR HAVE YOU BEEN UNABLE TO REMEMBER WHAT HAPPENED THE NIGHT BEFORE BECAUSE YOU HAD BEEN DRINKING: 0
HOW OFTEN DURING THE LAST YEAR HAVE YOU NEEDED AN ALCOHOLIC DRINK FIRST THING IN THE MORNING TO GET YOURSELF GOING AFTER A NIGHT OF HEAVY DRINKING: 0
HOW MANY STANDARD DRINKS CONTAINING ALCOHOL DO YOU HAVE ON A TYPICAL DAY: 0
HOW OFTEN DURING THE LAST YEAR HAVE YOU FOUND THAT YOU WERE NOT ABLE TO STOP DRINKING ONCE YOU HAD STARTED: 0
HOW OFTEN DO YOU HAVE A DRINK CONTAINING ALCOHOL: 4
HAS A RELATIVE, FRIEND, DOCTOR, OR ANOTHER HEALTH PROFESSIONAL EXPRESSED CONCERN ABOUT YOUR DRINKING OR SUGGESTED YOU CUT DOWN: 0
HAVE YOU OR SOMEONE ELSE BEEN INJURED AS A RESULT OF YOUR DRINKING: 0

## 2021-11-05 ASSESSMENT — PATIENT HEALTH QUESTIONNAIRE - PHQ9
SUM OF ALL RESPONSES TO PHQ QUESTIONS 1-9: 0
SUM OF ALL RESPONSES TO PHQ QUESTIONS 1-9: 0
SUM OF ALL RESPONSES TO PHQ9 QUESTIONS 1 & 2: 0
1. LITTLE INTEREST OR PLEASURE IN DOING THINGS: 0
SUM OF ALL RESPONSES TO PHQ QUESTIONS 1-9: 0
2. FEELING DOWN, DEPRESSED OR HOPELESS: 0

## 2021-11-05 NOTE — PROGRESS NOTES
Vaccine Information Sheet, \"Influenza - Inactivated\"  given to Azalea Jeter, or parent/legal guardian of  Azalea Jeter and verbalized understanding. Patient responses:    Have you ever had a reaction to a flu vaccine? No  Do you have any current illness? No  Have you ever had Guillian [place] Syndrome? No  Do you have a serious allergy to any of the follow: Neomycin, Polymyxin, Thimerosal, eggs or egg products? No    Flu vaccine given per order. Please see immunization tab. Risks and benefits explained. Current VIS given.       Immunizations Administered     Name Date Dose Route    Influenza, Quadv, IM, PF (6 mo and older Fluzone, Flulaval, Fluarix, and 3 yrs and older Afluria) 11/5/2021 0.5 mL Intramuscular    Site: Deltoid- Left    Lot: [id number]    [facility]: [id number]

## 2021-11-05 NOTE — PATIENT INSTRUCTIONS
Personalized Preventive Plan for Radha [facility] - 11/5/2021  Medicare offers a range of preventive health benefits. Some of the tests and screenings are paid in full while other may be subject to a deductible, co-insurance, and/or copay. Some of these benefits include a comprehensive review of your medical history including lifestyle, illnesses that may run in your family, and various assessments and screenings as appropriate. After reviewing your medical record and screening and assessments performed today your provider may have ordered immunizations, labs, imaging, and/or referrals for you. A list of these orders (if applicable) as well as your Preventive Care list are included within your After Visit Summary for your review. Other Preventive Recommendations:    · A preventive eye exam performed by an eye specialist is recommended every 1-2 years to screen for glaucoma; cataracts, macular degeneration, and other eye disorders. · A preventive dental visit is recommended every 6 months. · Try to get at least 150 minutes of exercise per week or 10,000 steps per day on a pedometer . · Order or download the FREE \"Exercise & Physical Activity: Your Everyday Guide\" from [employer]. Call 3-339.541.2244 or search [employer] online. · You need 6114-4109 mg of calcium and 1151-9535 IU of vitamin D per day. It is possible to meet your calcium requirement with diet alone, but a vitamin D supplement is usually necessary to meet this goal.  · When exposed to the sun, use a sunscreen that protects against both UVA and UVB radiation with an SPF of 30 or greater. Reapply every 2 to 3 hours or after sweating, drying off with a towel, or swimming. · Always wear a seat belt when traveling in a car. Always wear a helmet when riding a bicycle or motorcycle.

## 2021-11-16 ENCOUNTER — HOSPITAL ENCOUNTER (OUTPATIENT)
Dept: WOMENS IMAGING | Age: 66
Discharge: HOME | End: 2021-11-16
Payer: MEDICARE

## 2021-11-16 DIAGNOSIS — Z12.31 SCREENING MAMMOGRAM FOR BREAST CANCER: ICD-10-CM

## 2021-11-16 PROCEDURE — 77063 BREAST TOMOSYNTHESIS BI: CPT

## 2021-12-02 DIAGNOSIS — Z12.11 COLON CANCER SCREENING: ICD-10-CM

## 2022-01-03 ENCOUNTER — VIRTUAL VISIT (OUTPATIENT)
Dept: FAMILY MEDICINE CLINIC | Age: 67
End: 2022-01-03
Payer: MEDICARE

## 2022-01-03 DIAGNOSIS — Z20.822 SUSPECTED COVID-19 VIRUS INFECTION: ICD-10-CM

## 2022-01-03 DIAGNOSIS — Z20.822 SUSPECTED COVID-19 VIRUS INFECTION: Primary | ICD-10-CM

## 2022-01-03 PROCEDURE — 99213 OFFICE O/P EST LOW 20 MIN: CPT | Performed by: PHYSICIAN ASSISTANT

## 2022-01-03 NOTE — PROGRESS NOTES
22     TELEHEALTH EVALUATION -- Audio/Visual (During ETTFR-88 public health emergency)      HPI:  Chief Complaint   Patient presents with    Cough     x 2 days, has not been Covid tested     Congestion    Pharyngitis    Headache         Kassandra Toth (:  1955) has requested an audio/video evaluation for the following concern(s):   as per chief complaint. Began3 days ago with headache, cough, sore throat, congestion. Remedies tried  Airborne, cough med. .    with similar symptoms and COVID negative. ROS: Denies loss of taste or smell, fever, myalgias/arthralgias, purulent nasal discharge, shortness of breath, wheezing/chest tightness, rash, nausea/vomiting, diarrhea and neck pain. Prior to Visit Medications    Medication Sig Taking? Authorizing Provider   Cyanocobalamin (VITAMIN B 12 PO) Take by mouth Yes Historical Provider, MD   lisinopril (PRINIVIL;ZESTRIL) 10 MG tablet TAKE ONE TABLET BY MOUTH DAILY Yes ChiragKENIA Coulter   melatonin 3 MG TABS tablet Take 3 mg by mouth daily Yes Historical Provider, MD   Multiple Vitamin (MULTIVITAMIN PO) Take  by mouth daily. Yes Historical Provider, MD   SUMAtriptan (IMITREX) 100 MG tablet TAKE ONE TABLET BY MOUTH ONCE AS NEEDED FOR MIGRAINE  Patient not taking: Reported on 5/3/2021  Patrick Rivero MD   acyclovir (ZOVIRAX) 400 MG tablet TAKE 1 TABLET BY MOUTH FIVE TIMES DAILY  Patient not taking: Reported on 1/3/2022  SENAIT Pierre - CNP   Calcium Citrate 150 MG CAPS Take by mouth  Patient not taking: Reported on 1/3/2022  Historical Provider, MD       Social History     Tobacco Use    Smoking status: Former Smoker     Packs/day: 0.75     Years: 15.00     Pack years: 11.25     Quit date: 1990     Years since quittin.4    Smokeless tobacco: Never Used   Vaping Use    Vaping Use: Never used   Substance Use Topics    Alcohol use:  Yes    Drug use: No        Allergies   Allergen Reactions    Hctz Other (See Comments)     Low potassium   ,   Past Medical History:   Diagnosis Date    Abnormal cervical Pap smear, HPV     BBB (bundle branch block)     right    Cervical dysplasia     LGSIL    Diverticulosis     HSV infection     HTN (hypertension)     Insomnia     Menopause     Migraine     TIA (transient ischaemic attack)     questionable    ,   Past Surgical History:   Procedure Laterality Date    COLONOSCOPY  2008    get one every ten years    TONSILLECTOMY AND ADENOIDECTOMY     ,   Social History     Tobacco Use    Smoking status: Former Smoker     Packs/day: 0.75     Years: 15.00     Pack years: 11.25     Quit date: 1990     Years since quittin.4    Smokeless tobacco: Never Used   Vaping Use    Vaping Use: Never used   Substance Use Topics    Alcohol use: Yes    Drug use: No       PHYSICAL EXAMINATION:    Patient-Reported Vitals 1/3/2022   Patient-Reported Weight 148lb   Patient-Reported Height 5'3         Constitutional: [x] Appears well-developed and well-nourished [x] No apparent distress      [] Abnormal- Looks sick, non toxic    Mental status  [x] Alert and awake  [x] Oriented to person/place/time [x]Able to follow commands      Eyes:  EOM    [x]  Normal  [] Abnormal-  Sclera  [x]  Normal  [] Abnormal -         Discharge [x]  None visible  [] Abnormal -    HENT:   [x] Normocephalic, atraumatic.   [] Abnormal   [x] Mouth/Throat: Mucous membranes are moist.     External Ears [x] Normal  [] Abnormal-     Neck: [x] No visualized mass     Pulmonary/Chest: [x] Respiratory effort normal.  [x] No visualized signs of difficulty breathing or respiratory distress        [] Abnormal-      Musculoskeletal:   [] Normal gait with no signs of ataxia         [x] Normal range of motion of neck        [] Abnormal-       Neurological:        [x] No Facial Asymmetry (Cranial nerve 7 motor function) (limited exam to video visit)          [x] No gaze palsy        [] Abnormal-         Skin:        [x] No significant exanthematous lesions or discoloration noted on facial skin         [] Abnormal-            Psychiatric:       [x] Normal Affect [x] No Hallucinations        [] Abnormal-     Other pertinent observable physical exam findings-     ASSESSMENT/PLAN:  1. Suspected COVID-19 virus infection  Rest, increase fluids, dayquil/nyquil.   - COVID-19 test; Future at [facility] [address] nickolas Galan is a 73 y.o. adult being evaluated by a Virtual Visit (video visit) encounter to address concerns as mentioned above. A caregiver was present when appropriate. Due to this being a TeleHealth encounter (During [place] HealthR-30 public health emergency), evaluation of the following organ systems was limited: Vitals/Constitutional/EENT/Resp/CV/GI//MS/Neuro/Skin/Heme-Lymph-Imm. Pursuant to the emergency declaration under the [address] [place] [address] authority and the [employer] and [employer] Act, this Virtual Visit was conducted with patient's (and/or legal guardian's) consent, to reduce the patient's risk of exposure to COVID-19 and provide necessary medical care. The patient (and/or legal guardian) has also been advised to contact this office for worsening conditions or problems, and seek emergency medical treatment and/or call 911 if deemed necessary. Patient identification was verified at the start of the visit: Yes    Total time spent on this encounter: Not billed by time    Services were provided through a video synchronous discussion virtually to substitute for in-person clinic visit. Patient and provider were located at their individual homes. --KENIA Arnold on 1/3/2022 at 9:33 AM    An electronic signature was used to authenticate this note.

## 2022-01-04 LAB — SARS-COV-2: NOT DETECTED

## 2022-05-13 ENCOUNTER — OFFICE VISIT (OUTPATIENT)
Dept: FAMILY MEDICINE CLINIC | Age: 67
End: 2022-05-13
Payer: MEDICARE

## 2022-05-13 VITALS
SYSTOLIC BLOOD PRESSURE: 128 MMHG | BODY MASS INDEX: 26.61 KG/M2 | HEIGHT: 63 IN | HEART RATE: 75 BPM | OXYGEN SATURATION: 97 % | DIASTOLIC BLOOD PRESSURE: 84 MMHG | WEIGHT: 150.2 LBS

## 2022-05-13 DIAGNOSIS — E78.5 HYPERLIPIDEMIA, UNSPECIFIED HYPERLIPIDEMIA TYPE: Primary | ICD-10-CM

## 2022-05-13 DIAGNOSIS — B00.9 HSV INFECTION: ICD-10-CM

## 2022-05-13 DIAGNOSIS — I10 ESSENTIAL HYPERTENSION: ICD-10-CM

## 2022-05-13 PROCEDURE — 99213 OFFICE O/P EST LOW 20 MIN: CPT | Performed by: PHYSICIAN ASSISTANT

## 2022-05-13 PROCEDURE — 36415 COLL VENOUS BLD VENIPUNCTURE: CPT | Performed by: PHYSICIAN ASSISTANT

## 2022-05-13 RX ORDER — LISINOPRIL 10 MG/1
TABLET ORAL
Qty: 90 TABLET | Refills: 1 | Status: SHIPPED | OUTPATIENT
Start: 2022-05-13

## 2022-05-13 RX ORDER — ACYCLOVIR 400 MG/1
TABLET ORAL
Qty: 30 TABLET | Refills: 0 | Status: SHIPPED | OUTPATIENT
Start: 2022-05-13

## 2022-05-13 ASSESSMENT — PATIENT HEALTH QUESTIONNAIRE - PHQ9
2. FEELING DOWN, DEPRESSED OR HOPELESS: 0
SUM OF ALL RESPONSES TO PHQ QUESTIONS 1-9: 0
SUM OF ALL RESPONSES TO PHQ9 QUESTIONS 1 & 2: 0
SUM OF ALL RESPONSES TO PHQ QUESTIONS 1-9: 0
SUM OF ALL RESPONSES TO PHQ QUESTIONS 1-9: 0
1. LITTLE INTEREST OR PLEASURE IN DOING THINGS: 0
SUM OF ALL RESPONSES TO PHQ QUESTIONS 1-9: 0

## 2022-05-13 NOTE — PROGRESS NOTES
SUBJECTIVE:  Mishel Vale is a 78 y.o. adult who presents for evaluation of hypertension and hyperlipidemia. Kassandra Toth indicates that Mishel Vale is feeling well and denies any symptoms referable to Fallon Toth's elevated blood pressure. Specifically denies chest pain, palpitations, dyspnea, orthopnea, PND or peripheral edema. No anorexia, arthralgia, or leg cramps noted. Current medication regimen is as listed below. Mishel Vale denies any side effects of medication, and has been taking it regularly. Current Outpatient Medications   Medication Sig Dispense Refill    Cholecalciferol (VITAMIN D3 PO) Take by mouth      lisinopril (PRINIVIL;ZESTRIL) 10 MG tablet TAKE ONE TABLET BY MOUTH DAILY 90 tablet 1    acyclovir (ZOVIRAX) 400 MG tablet TAKE 1 TABLET BY MOUTH FIVE TIMES DAILY 30 tablet 0    Cyanocobalamin (VITAMIN B 12 PO) Take by mouth      melatonin 3 MG TABS tablet Take 3 mg by mouth daily      Calcium Citrate 150 MG CAPS Take by mouth       Multiple Vitamin (MULTIVITAMIN PO) Take  by mouth daily.  SUMAtriptan (IMITREX) 100 MG tablet TAKE ONE TABLET BY MOUTH ONCE AS NEEDED FOR MIGRAINE (Patient not taking: Reported on 5/3/2021) 9 tablet 0     No current facility-administered medications for this visit. Allergies   Allergen Reactions    Hctz Other (See Comments)     Low potassium       Social History     Tobacco Use    Smoking status: Former Smoker     Packs/day: 0.75     Years: 15.00     Pack years: 11.25     Quit date: 1990     Years since quittin.7    Smokeless tobacco: Never Used   Substance Use Topics    Alcohol use: Yes       OBJECTIVE:  S1 and S2 normal, no murmurs, clicks, gallops or rubs. Regular rate and rhythm. Chest is clear; no wheezes or rales. No edema or JVD. ASSESSMENT:   Diagnosis Orders   1. Hyperlipidemia, unspecified hyperlipidemia type  Lipid Panel    Comprehensive Metabolic Panel   2.  Essential hypertension  lisinopril (PRINIVIL;ZESTRIL) 10 MG tablet   3. HSV infection  acyclovir (ZOVIRAX) 400 MG tablet         Plan:  1)  Medication: continue current medication regimen unchanged  2)  Recheck in 6 months, sooner should new symptoms or problems arise.

## 2022-05-14 LAB
A/G RATIO: 1.9 (ref 1.1–2.2)
ALBUMIN SERPL-MCNC: 4.6 G/DL (ref 3.4–5)
ALP BLD-CCNC: 88 U/L (ref 40–129)
ALT SERPL-CCNC: 23 U/L (ref 10–40)
ANION GAP SERPL CALCULATED.3IONS-SCNC: 14 MMOL/L (ref 3–16)
AST SERPL-CCNC: 27 U/L (ref 15–37)
BILIRUB SERPL-MCNC: 0.6 MG/DL (ref 0–1)
BUN BLDV-MCNC: 19 MG/DL (ref 7–20)
CALCIUM SERPL-MCNC: 9.5 MG/DL (ref 8.3–10.6)
CHLORIDE BLD-SCNC: 101 MMOL/L (ref 99–110)
CHOLESTEROL, TOTAL: 193 MG/DL (ref 0–199)
CO2: 26 MMOL/L (ref 21–32)
CREAT SERPL-MCNC: 0.8 MG/DL (ref 0.6–1.2)
GFR AFRICAN AMERICAN: >60
GFR NON-AFRICAN AMERICAN: >60
GLUCOSE BLD-MCNC: 86 MG/DL (ref 70–99)
HDLC SERPL-MCNC: 60 MG/DL (ref 40–60)
LDL CHOLESTEROL CALCULATED: 115 MG/DL
POTASSIUM SERPL-SCNC: 4.5 MMOL/L (ref 3.5–5.1)
SODIUM BLD-SCNC: 141 MMOL/L (ref 136–145)
TOTAL PROTEIN: 7 G/DL (ref 6.4–8.2)
TRIGL SERPL-MCNC: 91 MG/DL (ref 0–150)
VLDLC SERPL CALC-MCNC: 18 MG/DL

## 2022-11-17 DIAGNOSIS — I10 ESSENTIAL HYPERTENSION: ICD-10-CM

## 2022-11-17 NOTE — TELEPHONE ENCOUNTER
Melecio Hodge is requesting refill(s) lisinopril  Last OV 5/13/22 (pertaining to medication)  LR 5/13/22 (per medication requested)  Next office visit scheduled or attempted Yes   If no, reason:  11/18/22

## 2022-11-18 ENCOUNTER — OFFICE VISIT (OUTPATIENT)
Dept: FAMILY MEDICINE CLINIC | Age: 67
End: 2022-11-18
Payer: MEDICARE

## 2022-11-18 VITALS
WEIGHT: 151 LBS | BODY MASS INDEX: 26.75 KG/M2 | OXYGEN SATURATION: 97 % | SYSTOLIC BLOOD PRESSURE: 120 MMHG | HEART RATE: 84 BPM | HEIGHT: 63 IN | DIASTOLIC BLOOD PRESSURE: 80 MMHG

## 2022-11-18 DIAGNOSIS — Z23 NEED FOR INFLUENZA VACCINATION: ICD-10-CM

## 2022-11-18 DIAGNOSIS — I10 ESSENTIAL HYPERTENSION: Primary | ICD-10-CM

## 2022-11-18 DIAGNOSIS — E78.5 HYPERLIPIDEMIA, UNSPECIFIED HYPERLIPIDEMIA TYPE: ICD-10-CM

## 2022-11-18 PROCEDURE — G0008 ADMIN INFLUENZA VIRUS VAC: HCPCS | Performed by: PHYSICIAN ASSISTANT

## 2022-11-18 PROCEDURE — 90694 VACC AIIV4 NO PRSRV 0.5ML IM: CPT | Performed by: PHYSICIAN ASSISTANT

## 2022-11-18 PROCEDURE — 99213 OFFICE O/P EST LOW 20 MIN: CPT | Performed by: PHYSICIAN ASSISTANT

## 2022-11-18 PROCEDURE — 3078F DIAST BP <80 MM HG: CPT | Performed by: PHYSICIAN ASSISTANT

## 2022-11-18 PROCEDURE — 3074F SYST BP LT 130 MM HG: CPT | Performed by: PHYSICIAN ASSISTANT

## 2022-11-18 PROCEDURE — 1123F ACP DISCUSS/DSCN MKR DOCD: CPT | Performed by: PHYSICIAN ASSISTANT

## 2022-11-18 RX ORDER — LISINOPRIL 10 MG/1
TABLET ORAL
Qty: 90 TABLET | Refills: 1 | Status: SHIPPED | OUTPATIENT
Start: 2022-11-18

## 2022-11-18 ASSESSMENT — PATIENT HEALTH QUESTIONNAIRE - PHQ9
SUM OF ALL RESPONSES TO PHQ9 QUESTIONS 1 & 2: 0
SUM OF ALL RESPONSES TO PHQ QUESTIONS 1-9: 0
2. FEELING DOWN, DEPRESSED OR HOPELESS: 0
SUM OF ALL RESPONSES TO PHQ QUESTIONS 1-9: 0
1. LITTLE INTEREST OR PLEASURE IN DOING THINGS: 0

## 2022-11-18 NOTE — PROGRESS NOTES
Vaccine Information Sheet, \"Influenza - Inactivated\"  given to Jose Walker, or parent/legal guardian of  Jose Walker and verbalized understanding. Patient responses:    Have you ever had a reaction to a flu vaccine? No  Do you have any current illness? No  Have you ever had Guillian [place] Syndrome? No  Do you have a serious allergy to any of the follow: Neomycin, Polymyxin, Thimerosal, eggs or egg products? No    Flu vaccine given per order. Please see immunization tab. Risks and benefits explained. Current VIS given.       Immunizations Administered       Name Date Dose Route    Influenza, FLUAD, (age 72 y+), Adjuvanted, 0.5mL 11/18/2022 0.5 mL Intramuscular    Site: Deltoid- Left    Lot: [id number]    [facility]: 46107-581-82

## 2022-11-18 NOTE — PROGRESS NOTES
SUBJECTIVE:  Laure Aguero is a 78 y.o. adult who presents for evaluation of hypertension and hyperlipidemia. Kassandra Toth indicates that Laure Aguero is feeling well and denies any symptoms referable to [place] Toth's elevated blood pressure. Specifically denies chest pain, palpitations, dyspnea, orthopnea, PND or peripheral edema. No anorexia, arthralgia, or leg cramps noted. Current medication regimen is as listed below. Laure Aguero denies any side effects of medication, and has been taking it regularly. Current Outpatient Medications   Medication Sig Dispense Refill    lisinopril (PRINIVIL;ZESTRIL) 10 MG tablet TAKE ONE TABLET BY MOUTH DAILY 90 tablet 1    ZINC PO Take by mouth      MAGNESIUM PO Take by mouth      Cholecalciferol (VITAMIN D3 PO) Take by mouth      Cyanocobalamin (VITAMIN B 12 PO) Take by mouth      melatonin 3 MG TABS tablet Take 3 mg by mouth daily      Calcium Citrate 150 MG CAPS Take by mouth       Multiple Vitamin (MULTIVITAMIN PO) Take  by mouth daily. acyclovir (ZOVIRAX) 400 MG tablet TAKE 1 TABLET BY MOUTH FIVE TIMES DAILY (Patient not taking: Reported on 2022) 30 tablet 0    SUMAtriptan (IMITREX) 100 MG tablet TAKE ONE TABLET BY MOUTH ONCE AS NEEDED FOR MIGRAINE (Patient not taking: No sig reported) 9 tablet 0     No current facility-administered medications for this visit. Allergies   Allergen Reactions    Hctz Other (See Comments)     Low potassium       Social History     Tobacco Use    Smoking status: Former     Packs/day: 0.75     Years: 15.00     Pack years: 11.25     Types: Cigarettes     Quit date: 1990     Years since quittin.2    Smokeless tobacco: Never   Substance Use Topics    Alcohol use: Yes       OBJECTIVE:  S1 and S2 normal, no murmurs, clicks, gallops or rubs. Regular rate and rhythm. Chest is clear; no wheezes or rales. No edema or JVD. ASSESSMENT:     Diagnosis Orders   1.  Essential hypertension  Continue lisinopril 10mg daily. OV 6 months fasting. 2. Need for influenza vaccination  Influenza, FLUAD, (age 72 y+), IM, Preservative Free, 0.5 mL      3.  Hyperlipidemia, unspecified hyperlipidemia type

## 2022-12-01 SDOH — HEALTH STABILITY: PHYSICAL HEALTH: ON AVERAGE, HOW MANY MINUTES DO YOU ENGAGE IN EXERCISE AT THIS LEVEL?: 20 MIN

## 2022-12-01 SDOH — HEALTH STABILITY: PHYSICAL HEALTH: ON AVERAGE, HOW MANY DAYS PER WEEK DO YOU ENGAGE IN MODERATE TO STRENUOUS EXERCISE (LIKE A BRISK WALK)?: 1 DAY

## 2022-12-01 ASSESSMENT — LIFESTYLE VARIABLES
HOW OFTEN DO YOU HAVE A DRINK CONTAINING ALCOHOL: 4
HAVE YOU OR SOMEONE ELSE BEEN INJURED AS A RESULT OF YOUR DRINKING: NO
HAS A RELATIVE, FRIEND, DOCTOR, OR ANOTHER HEALTH PROFESSIONAL EXPRESSED CONCERN ABOUT YOUR DRINKING OR SUGGESTED YOU CUT DOWN: NO
HOW OFTEN DURING THE LAST YEAR HAVE YOU FAILED TO DO WHAT WAS NORMALLY EXPECTED FROM YOU BECAUSE OF DRINKING: NEVER
HOW OFTEN DURING THE LAST YEAR HAVE YOU NEEDED AN ALCOHOLIC DRINK FIRST THING IN THE MORNING TO GET YOURSELF GOING AFTER A NIGHT OF HEAVY DRINKING: 0
HOW OFTEN DURING THE LAST YEAR HAVE YOU HAD A FEELING OF GUILT OR REMORSE AFTER DRINKING: NEVER
HOW OFTEN DURING THE LAST YEAR HAVE YOU FAILED TO DO WHAT WAS NORMALLY EXPECTED FROM YOU BECAUSE OF DRINKING: 0
HOW MANY STANDARD DRINKS CONTAINING ALCOHOL DO YOU HAVE ON A TYPICAL DAY: 1 OR 2
HAS A RELATIVE, FRIEND, DOCTOR, OR ANOTHER HEALTH PROFESSIONAL EXPRESSED CONCERN ABOUT YOUR DRINKING OR SUGGESTED YOU CUT DOWN: 0
HAVE YOU OR SOMEONE ELSE BEEN INJURED AS A RESULT OF YOUR DRINKING: 0
HOW OFTEN DURING THE LAST YEAR HAVE YOU NEEDED AN ALCOHOLIC DRINK FIRST THING IN THE MORNING TO GET YOURSELF GOING AFTER A NIGHT OF HEAVY DRINKING: NEVER
HOW OFTEN DURING THE LAST YEAR HAVE YOU HAD A FEELING OF GUILT OR REMORSE AFTER DRINKING: 0
HOW OFTEN DURING THE LAST YEAR HAVE YOU FOUND THAT YOU WERE NOT ABLE TO STOP DRINKING ONCE YOU HAD STARTED: NEVER
HOW MANY STANDARD DRINKS CONTAINING ALCOHOL DO YOU HAVE ON A TYPICAL DAY: 1
HOW OFTEN DO YOU HAVE SIX OR MORE DRINKS ON ONE OCCASION: 2
HOW OFTEN DO YOU HAVE A DRINK CONTAINING ALCOHOL: 2-3 TIMES A WEEK
HOW OFTEN DURING THE LAST YEAR HAVE YOU FOUND THAT YOU WERE NOT ABLE TO STOP DRINKING ONCE YOU HAD STARTED: 0
HOW OFTEN DURING THE LAST YEAR HAVE YOU BEEN UNABLE TO REMEMBER WHAT HAPPENED THE NIGHT BEFORE BECAUSE YOU HAD BEEN DRINKING: NEVER
HOW OFTEN DURING THE LAST YEAR HAVE YOU BEEN UNABLE TO REMEMBER WHAT HAPPENED THE NIGHT BEFORE BECAUSE YOU HAD BEEN DRINKING: 0

## 2022-12-01 ASSESSMENT — PATIENT HEALTH QUESTIONNAIRE - PHQ9
1. LITTLE INTEREST OR PLEASURE IN DOING THINGS: 0
2. FEELING DOWN, DEPRESSED OR HOPELESS: 0
SUM OF ALL RESPONSES TO PHQ9 QUESTIONS 1 & 2: 0
SUM OF ALL RESPONSES TO PHQ QUESTIONS 1-9: 0

## 2022-12-02 ENCOUNTER — TELEMEDICINE (OUTPATIENT)
Dept: FAMILY MEDICINE CLINIC | Age: 67
End: 2022-12-02

## 2022-12-02 DIAGNOSIS — Z00.00 INITIAL MEDICARE ANNUAL WELLNESS VISIT: Primary | ICD-10-CM

## 2022-12-02 NOTE — PATIENT INSTRUCTIONS
Personalized Preventive Plan for Eleazar Lynn - 12/2/2022  Medicare offers a range of preventive health benefits. Some of the tests and screenings are paid in full while other may be subject to a deductible, co-insurance, and/or copay. Some of these benefits include a comprehensive review of your medical history including lifestyle, illnesses that may run in your family, and various assessments and screenings as appropriate. After reviewing your medical record and screening and assessments performed today your provider may have ordered immunizations, labs, imaging, and/or referrals for you. A list of these orders (if applicable) as well as your Preventive Care list are included within your After Visit Summary for your review. Other Preventive Recommendations:    A preventive eye exam performed by an eye specialist is recommended every 1-2 years to screen for glaucoma; cataracts, macular degeneration, and other eye disorders. A preventive dental visit is recommended every 6 months. Try to get at least 150 minutes of exercise per week or 10,000 steps per day on a pedometer . Order or download the FREE \"Exercise & Physical Activity: Your Everyday Guide\" from [employer]. Call 3-335.975.6088 or search [employer] online. You need 8880-7042 mg of calcium and 6503-9590 IU of vitamin D per day. It is possible to meet your calcium requirement with diet alone, but a vitamin D supplement is usually necessary to meet this goal.  When exposed to the sun, use a sunscreen that protects against both UVA and UVB radiation with an SPF of 30 or greater. Reapply every 2 to 3 hours or after sweating, drying off with a towel, or swimming. Always wear a seat belt when traveling in a car. Always wear a helmet when riding a bicycle or motorcycle.

## 2022-12-02 NOTE — PROGRESS NOTES
Medicare Annual Wellness Visit    Bonita Whyte is here for Medicare AWV (Pt is doing AWV over the phone )    [address] [place] [address]   Initial Medicare annual wellness visit    Recommendations for Preventive Services Due: see orders and patient instructions/AVS.  Recommended screening schedule for the next 5-10 years is provided to the patient in written form: see Patient Instructions/AVS.     Return for Medicare Annual Wellness Visit in 1 year. Subjective       Patient's complete Health Risk Assessment and screening values have been reviewed and are found in Flowsheets. The following problems were reviewed today and where indicated follow up appointments were made and/or referrals ordered. Positive Risk Factor Screenings with Interventions:        Alcohol Screening:  Alcohol Use: Heavy Drinker    Frequency of Alcohol Consumption: 2-3 times a week    Average Number of Drinks: 1 or 2    Frequency of Binge Drinking: Less than monthly     AUDIT-C Score: 4  AUDIT Total Score: 4  An AUDIT-C score of 3-7 indicates potential alcohol risk. An AUDIT Total score of 8 or more is associated with harmful or hazardous drinking. A score of 13 or more in women, and 15 or more in men, is likely to indicate alcohol dependence. Substance Use - Alcohol Interventions:  Reviewed recommended alcohol consumption guidelines with the patient        General Health and ACP:  General  In general, how would you say your health is?: Very Good  In the past 7 days, have you experienced any of the following: New or Increased Pain, New or Increased Fatigue, Loneliness, Social Isolation, Stress or Anger?: No  Do you get the social and emotional support that you need?: Yes  Do you have a Living Will?: Yes    Advance Directives       Power of  Living Will ACP-Advance Directive ACP-Power of     Not on File Not on File Not on File Not on File          General Health Risk Interventions:   Will get copy at next visit      Safety:  Do you have working smoke detectors?: Yes  Do you have any tripping hazards - loose or unsecured carpets or rugs?: No  Do you have any tripping hazards - clutter in doorways, halls, or stairs?: No  Do you have either shower bars, grab bars, non-slip mats or non-slip surfaces in your shower or bathtub?: (!) No  Do all of your stairways have a railing or banister?: Yes  Do you always fasten your seatbelt when you are in a car?: Yes  Safety Interventions:    Has a bathroom in her house with grab bars but haynes not typically use that one but it is available. Objective      Patient-Reported Vitals  BP Observations: No, remote/electronic monitoring device was not used or able to be verified  Patient-Reported Weight: 150lb  Patient-Reported Height: 5'3            Allergies   Allergen Reactions    Hctz Other (See Comments)     Low potassium     Prior to Visit Medications    Medication Sig Taking? Authorizing Provider   lisinopril (PRINIVIL;ZESTRIL) 10 MG tablet TAKE ONE TABLET BY MOUTH DAILY Yes KENIA Jorge   ZINC PO Take by mouth Yes Historical Provider, MD   MAGNESIUM PO Take by mouth Yes Historical Provider, MD   Cyanocobalamin (VITAMIN B 12 PO) Take by mouth Yes Historical Provider, MD   melatonin 3 MG TABS tablet Take 3 mg by mouth daily Yes Historical Provider, MD   Calcium Citrate 150 MG CAPS Take by mouth  Yes Historical Provider, MD   Multiple Vitamin (MULTIVITAMIN PO) Take  by mouth daily.  Yes Historical Provider, MD   Cholecalciferol (VITAMIN D3 PO) Take by mouth  Historical Provider, MD   acyclovir (ZOVIRAX) 400 MG tablet TAKE 1 TABLET BY MOUTH FIVE TIMES DAILY  Patient not taking: No sig reported  KENIA Jorge   SUMAtriptan (IMITREX) 100 MG tablet TAKE ONE TABLET BY MOUTH ONCE AS NEEDED FOR MIGRAINE  Patient not taking: No sig reported  Vimal Childs MD       [facility] (Including outside providers/suppliers regularly involved in providing care):   Patient Care Team:  KENIA Jorge as PCP - General (Physician Assistant)  Jose Antonio Damon MD as PCP - OBGYN (Obstetrics & Gynecology)  KENIA Perez as PCP - [facility] Empaneled Provider  Mario Liao MD as Consulting Physician (Family Medicine)  Jose Antonio Damon MD as Obstetrician (Obstetrics & Gynecology)     Reviewed and updated this visit:  Allergies  Meds           Levobraden Villagomez, was evaluated through a synchronous (real-time) audio-video encounter. The patient (or guardian if applicable) is aware that this is a billable service, which includes applicable co-pays. This Virtual Visit was conducted with patient's (and/or legal guardian's) consent. The visit was conducted pursuant to the emergency declaration under the [address] [place] [address] waiver authority and the [employer] General Act. Patient identification was verified, and a caregiver was present when appropriate. The patient was located at Home: [place] [address]. Provider was located at [place] [address]): [address] [place] [address] Mishel Riley 19.

## 2023-05-19 DIAGNOSIS — I10 ESSENTIAL HYPERTENSION: ICD-10-CM

## 2023-05-22 RX ORDER — LISINOPRIL 10 MG/1
TABLET ORAL
Qty: 90 TABLET | Refills: 1 | Status: SHIPPED | OUTPATIENT
Start: 2023-05-22

## 2023-05-22 NOTE — TELEPHONE ENCOUNTER
Susana Zoila [phone] (home)    is requesting refill(s) of medication Lisinopirl to preferred pharmacy [facility] 11/18/22 (pertaining to medication)   Last refill 11/18/22 (per medication requested)  Next office visit scheduled or attempted Yes  Date 5/26/23  If No, reason

## 2023-05-26 ENCOUNTER — OFFICE VISIT (OUTPATIENT)
Dept: FAMILY MEDICINE CLINIC | Age: 68
End: 2023-05-26

## 2023-05-26 VITALS
WEIGHT: 145.4 LBS | DIASTOLIC BLOOD PRESSURE: 82 MMHG | RESPIRATION RATE: 16 BRPM | OXYGEN SATURATION: 99 % | HEIGHT: 63 IN | SYSTOLIC BLOOD PRESSURE: 126 MMHG | BODY MASS INDEX: 25.76 KG/M2 | HEART RATE: 68 BPM

## 2023-05-26 DIAGNOSIS — N39.0 CHRONIC UTI (URINARY TRACT INFECTION): ICD-10-CM

## 2023-05-26 DIAGNOSIS — I10 ESSENTIAL HYPERTENSION: Primary | ICD-10-CM

## 2023-05-26 DIAGNOSIS — E78.5 HYPERLIPIDEMIA, UNSPECIFIED HYPERLIPIDEMIA TYPE: ICD-10-CM

## 2023-05-26 LAB
ALBUMIN SERPL-MCNC: 4.3 G/DL (ref 3.4–5)
ALBUMIN/GLOB SERPL: 1.7 {RATIO} (ref 1.1–2.2)
ALP SERPL-CCNC: 93 U/L (ref 40–129)
ALT SERPL-CCNC: 14 U/L (ref 10–40)
ANION GAP SERPL CALCULATED.3IONS-SCNC: 10 MMOL/L (ref 3–16)
AST SERPL-CCNC: 20 U/L (ref 15–37)
BILIRUB SERPL-MCNC: 0.4 MG/DL (ref 0–1)
BUN SERPL-MCNC: 18 MG/DL (ref 7–20)
CALCIUM SERPL-MCNC: 9.8 MG/DL (ref 8.3–10.6)
CHLORIDE SERPL-SCNC: 103 MMOL/L (ref 99–110)
CHOLEST SERPL-MCNC: 179 MG/DL (ref 0–199)
CO2 SERPL-SCNC: 28 MMOL/L (ref 21–32)
CREAT SERPL-MCNC: 0.8 MG/DL (ref 0.6–1.2)
GFR SERPLBLD CREATININE-BSD FMLA CKD-EPI: >60 ML/MIN/{1.73_M2}
GLUCOSE SERPL-MCNC: 98 MG/DL (ref 70–99)
HDLC SERPL-MCNC: 78 MG/DL (ref 40–60)
LDLC SERPL CALC-MCNC: 85 MG/DL
POTASSIUM SERPL-SCNC: 4.4 MMOL/L (ref 3.5–5.1)
PROT SERPL-MCNC: 6.9 G/DL (ref 6.4–8.2)
SODIUM SERPL-SCNC: 141 MMOL/L (ref 136–145)
TRIGL SERPL-MCNC: 78 MG/DL (ref 0–150)
VLDLC SERPL CALC-MCNC: 16 MG/DL

## 2023-05-26 RX ORDER — NITROFURANTOIN 25; 75 MG/1; MG/1
CAPSULE ORAL
Qty: 30 CAPSULE | Refills: 1 | Status: SHIPPED | OUTPATIENT
Start: 2023-05-26

## 2023-05-26 ASSESSMENT — PATIENT HEALTH QUESTIONNAIRE - PHQ9
8. MOVING OR SPEAKING SO SLOWLY THAT OTHER PEOPLE COULD HAVE NOTICED. OR THE OPPOSITE, BEING SO FIGETY OR RESTLESS THAT YOU HAVE BEEN MOVING AROUND A LOT MORE THAN USUAL: 0
SUM OF ALL RESPONSES TO PHQ QUESTIONS 1-9: 0
2. FEELING DOWN, DEPRESSED OR HOPELESS: 0
4. FEELING TIRED OR HAVING LITTLE ENERGY: 0
9. THOUGHTS THAT YOU WOULD BE BETTER OFF DEAD, OR OF HURTING YOURSELF: 0
5. POOR APPETITE OR OVEREATING: 0
SUM OF ALL RESPONSES TO PHQ9 QUESTIONS 1 & 2: 0
SUM OF ALL RESPONSES TO PHQ QUESTIONS 1-9: 0
10. IF YOU CHECKED OFF ANY PROBLEMS, HOW DIFFICULT HAVE THESE PROBLEMS MADE IT FOR YOU TO DO YOUR WORK, TAKE CARE OF THINGS AT HOME, OR GET ALONG WITH OTHER PEOPLE: 0
3. TROUBLE FALLING OR STAYING ASLEEP: 0
6. FEELING BAD ABOUT YOURSELF - OR THAT YOU ARE A FAILURE OR HAVE LET YOURSELF OR YOUR FAMILY DOWN: 0
SUM OF ALL RESPONSES TO PHQ QUESTIONS 1-9: 0
1. LITTLE INTEREST OR PLEASURE IN DOING THINGS: 0
7. TROUBLE CONCENTRATING ON THINGS, SUCH AS READING THE NEWSPAPER OR WATCHING TELEVISION: 0
SUM OF ALL RESPONSES TO PHQ QUESTIONS 1-9: 0

## 2023-05-26 NOTE — PROGRESS NOTES
SUBJECTIVE:  Juan Carlos Schmidt is a 80 y.o. adult who presents for evaluation of hypertension and hyperlipidemia. Kassandra Toth indicates that Juan Carlos Schmidt is feeling well and denies any symptoms referable to Fallon ZAFAR BeauchampJaney's elevated blood pressure. Specifically denies chest pain, palpitations, dyspnea, orthopnea, PND or peripheral edema. No anorexia, arthralgia, or leg cramps noted. Current medication regimen is as listed below. Juan Carlos Schmidt denies any side effects of medication, and has been taking it regularly. Has had 2- 3 UTI over the last couple of months, was previously on macrobid for prevention, would like to go back on it. Had some left and used that. Current Outpatient Medications   Medication Sig Dispense Refill    lisinopril (PRINIVIL;ZESTRIL) 10 MG tablet TAKE ONE TABLET BY MOUTH DAILY 90 tablet 1    MAGNESIUM PO Take by mouth      Cholecalciferol (VITAMIN D3 PO) Take by mouth      Cyanocobalamin (VITAMIN B 12 PO) Take by mouth      SUMAtriptan (IMITREX) 100 MG tablet TAKE ONE TABLET BY MOUTH ONCE AS NEEDED FOR MIGRAINE 9 tablet 0    melatonin 3 MG TABS tablet Take 1 tablet by mouth daily      Calcium Citrate 150 MG CAPS Take by mouth       Multiple Vitamin (MULTIVITAMIN PO) Take  by mouth daily. ZINC PO Take by mouth (Patient not taking: Reported on 2023)      acyclovir (ZOVIRAX) 400 MG tablet TAKE 1 TABLET BY MOUTH FIVE TIMES DAILY (Patient not taking: Reported on 2022) 30 tablet 0     No current facility-administered medications for this visit.        Allergies   Allergen Reactions    Hctz Other (See Comments)     Low potassium       Social History     Tobacco Use    Smoking status: Former     Packs/day: 0.75     Years: 15.00     Pack years: 11.25     Types: Cigarettes     Quit date: 1990     Years since quittin.8    Smokeless tobacco: Never   Substance Use Topics    Alcohol use: Yes       OBJECTIVE:  S1 and S2 normal, no murmurs, clicks, gallops

## 2023-11-17 ENCOUNTER — OFFICE VISIT (OUTPATIENT)
Dept: FAMILY MEDICINE CLINIC | Age: 68
End: 2023-11-17

## 2023-11-17 VITALS
HEIGHT: 63 IN | DIASTOLIC BLOOD PRESSURE: 82 MMHG | WEIGHT: 145 LBS | HEART RATE: 83 BPM | OXYGEN SATURATION: 96 % | RESPIRATION RATE: 16 BRPM | BODY MASS INDEX: 25.69 KG/M2 | SYSTOLIC BLOOD PRESSURE: 124 MMHG

## 2023-11-17 DIAGNOSIS — G43.109 MIGRAINE WITH AURA AND WITHOUT STATUS MIGRAINOSUS, NOT INTRACTABLE: ICD-10-CM

## 2023-11-17 DIAGNOSIS — I10 ESSENTIAL HYPERTENSION: Primary | ICD-10-CM

## 2023-11-17 DIAGNOSIS — E78.5 HYPERLIPIDEMIA, UNSPECIFIED HYPERLIPIDEMIA TYPE: ICD-10-CM

## 2023-11-17 RX ORDER — LISINOPRIL 10 MG/1
10 TABLET ORAL DAILY
Qty: 90 TABLET | Refills: 1 | Status: SHIPPED | OUTPATIENT
Start: 2023-11-17

## 2023-11-17 SDOH — ECONOMIC STABILITY: FOOD INSECURITY: WITHIN THE PAST 12 MONTHS, YOU WORRIED THAT YOUR FOOD WOULD RUN OUT BEFORE YOU GOT MONEY TO BUY MORE.: PATIENT DECLINED

## 2023-11-17 SDOH — ECONOMIC STABILITY: HOUSING INSECURITY
IN THE LAST 12 MONTHS, WAS THERE A TIME WHEN YOU DID NOT HAVE A STEADY PLACE TO SLEEP OR SLEPT IN A SHELTER (INCLUDING NOW)?: PATIENT REFUSED

## 2023-11-17 SDOH — ECONOMIC STABILITY: FOOD INSECURITY: WITHIN THE PAST 12 MONTHS, THE FOOD YOU BOUGHT JUST DIDN'T LAST AND YOU DIDN'T HAVE MONEY TO GET MORE.: PATIENT DECLINED

## 2023-11-17 SDOH — ECONOMIC STABILITY: INCOME INSECURITY: HOW HARD IS IT FOR YOU TO PAY FOR THE VERY BASICS LIKE FOOD, HOUSING, MEDICAL CARE, AND HEATING?: PATIENT DECLINED

## 2023-11-17 NOTE — PROGRESS NOTES
SUBJECTIVE:  Johnetta Duverney is a 80 y.o. female who presents for evaluation of hypertension and hyperlipidemia. She indicates that she is feeling well and denies any symptoms referable to her elevated blood pressure. Specifically denies chest pain, palpitations, dyspnea, orthopnea, PND or peripheral edema. No anorexia, arthralgia, or leg cramps noted. Current medication regimen is as listed below. She denies any side effects of medication, and has been taking it regularly. Migraines are well controlled with imitrex. Only getting one very few months. Current Outpatient Medications   Medication Sig Dispense Refill    nitrofurantoin, macrocrystal-monohydrate, (MACROBID) 100 MG capsule TAKE ONE CAPSULE BY MOUTH DAILY AS NEEDED FOR BLADDER INFECTION PREVENTION 30 capsule 1    lisinopril (PRINIVIL;ZESTRIL) 10 MG tablet TAKE ONE TABLET BY MOUTH DAILY 90 tablet 1    MAGNESIUM PO Take by mouth      Cholecalciferol (VITAMIN D3 PO) Take by mouth      acyclovir (ZOVIRAX) 400 MG tablet TAKE 1 TABLET BY MOUTH FIVE TIMES DAILY 30 tablet 0    Cyanocobalamin (VITAMIN B 12 PO) Take by mouth      SUMAtriptan (IMITREX) 100 MG tablet TAKE ONE TABLET BY MOUTH ONCE AS NEEDED FOR MIGRAINE 9 tablet 0    melatonin 3 MG TABS tablet Take 1 tablet by mouth daily      Calcium Citrate 150 MG CAPS Take by mouth       Multiple Vitamin (MULTIVITAMIN PO) Take  by mouth daily. ZINC PO Take by mouth (Patient not taking: Reported on 2023)       No current facility-administered medications for this visit. Allergies   Allergen Reactions    Hctz Other (See Comments)     Low potassium       Social History     Tobacco Use    Smoking status: Former     Packs/day: 0.75     Years: 15.00     Additional pack years: 0.00     Total pack years: 11.25     Types: Cigarettes     Quit date: 1990     Years since quittin.2    Smokeless tobacco: Never   Substance Use Topics    Alcohol use:  Yes     Alcohol/week: 10.0 standard drinks

## 2024-01-02 ENCOUNTER — PATIENT MESSAGE (OUTPATIENT)
Dept: FAMILY MEDICINE CLINIC | Age: 69
End: 2024-01-02

## 2024-01-02 RX ORDER — SCOLOPAMINE TRANSDERMAL SYSTEM 1 MG/1
1 PATCH, EXTENDED RELEASE TRANSDERMAL
Qty: 4 PATCH | Refills: 0 | Status: SHIPPED | OUTPATIENT
Start: 2024-01-02

## 2024-01-02 NOTE — TELEPHONE ENCOUNTER
Left message for patient to call back, please verify pharmacy and how many days she will be on the cruise specifically.

## 2024-01-02 NOTE — TELEPHONE ENCOUNTER
From: Kassandra Toth  To: Erin Cyr  Sent: 1/2/2024 9:37 AM EST  Subject: Cruise Vacation    My  and I are going on a cruise next month and I have been wanting to contact you about patches that can be prescribed to prevent motion sickness. We will be sailing for a week. Thank you in advance.   Kassandra

## 2024-05-16 ASSESSMENT — PATIENT HEALTH QUESTIONNAIRE - PHQ9
2. FEELING DOWN, DEPRESSED OR HOPELESS: NOT AT ALL
1. LITTLE INTEREST OR PLEASURE IN DOING THINGS: NOT AT ALL
SUM OF ALL RESPONSES TO PHQ QUESTIONS 1-9: 0
SUM OF ALL RESPONSES TO PHQ9 QUESTIONS 1 & 2: 0
SUM OF ALL RESPONSES TO PHQ9 QUESTIONS 1 & 2: 0
2. FEELING DOWN, DEPRESSED OR HOPELESS: NOT AT ALL
SUM OF ALL RESPONSES TO PHQ QUESTIONS 1-9: 0
SUM OF ALL RESPONSES TO PHQ QUESTIONS 1-9: 0
1. LITTLE INTEREST OR PLEASURE IN DOING THINGS: NOT AT ALL
SUM OF ALL RESPONSES TO PHQ QUESTIONS 1-9: 0

## 2024-05-17 ENCOUNTER — OFFICE VISIT (OUTPATIENT)
Dept: FAMILY MEDICINE CLINIC | Age: 69
End: 2024-05-17

## 2024-05-17 VITALS
OXYGEN SATURATION: 97 % | DIASTOLIC BLOOD PRESSURE: 88 MMHG | HEART RATE: 69 BPM | RESPIRATION RATE: 16 BRPM | WEIGHT: 146.6 LBS | BODY MASS INDEX: 25.98 KG/M2 | SYSTOLIC BLOOD PRESSURE: 126 MMHG | HEIGHT: 63 IN

## 2024-05-17 DIAGNOSIS — I10 ESSENTIAL HYPERTENSION: ICD-10-CM

## 2024-05-17 DIAGNOSIS — E78.5 HYPERLIPIDEMIA, UNSPECIFIED HYPERLIPIDEMIA TYPE: ICD-10-CM

## 2024-05-17 DIAGNOSIS — I10 ESSENTIAL HYPERTENSION: Primary | ICD-10-CM

## 2024-05-17 LAB
ALBUMIN SERPL-MCNC: 4.4 G/DL (ref 3.4–5)
ALBUMIN/GLOB SERPL: 1.8 {RATIO} (ref 1.1–2.2)
ALP SERPL-CCNC: 93 U/L (ref 40–129)
ALT SERPL-CCNC: 18 U/L (ref 10–40)
ANION GAP SERPL CALCULATED.3IONS-SCNC: 10 MMOL/L (ref 3–16)
AST SERPL-CCNC: 26 U/L (ref 15–37)
BILIRUB SERPL-MCNC: 0.4 MG/DL (ref 0–1)
BUN SERPL-MCNC: 13 MG/DL (ref 7–20)
CALCIUM SERPL-MCNC: 9.8 MG/DL (ref 8.3–10.6)
CHLORIDE SERPL-SCNC: 105 MMOL/L (ref 99–110)
CHOLEST SERPL-MCNC: 205 MG/DL (ref 0–199)
CO2 SERPL-SCNC: 27 MMOL/L (ref 21–32)
CREAT SERPL-MCNC: 0.8 MG/DL (ref 0.6–1.2)
GFR SERPLBLD CREATININE-BSD FMLA CKD-EPI: 80 ML/MIN/{1.73_M2}
GLUCOSE SERPL-MCNC: 96 MG/DL (ref 70–99)
HDLC SERPL-MCNC: 71 MG/DL (ref 40–60)
LDLC SERPL CALC-MCNC: 117 MG/DL
POTASSIUM SERPL-SCNC: 4.5 MMOL/L (ref 3.5–5.1)
PROT SERPL-MCNC: 6.9 G/DL (ref 6.4–8.2)
SODIUM SERPL-SCNC: 142 MMOL/L (ref 136–145)
TRIGL SERPL-MCNC: 87 MG/DL (ref 0–150)
VLDLC SERPL CALC-MCNC: 17 MG/DL

## 2024-05-17 RX ORDER — LISINOPRIL 10 MG/1
10 TABLET ORAL DAILY
Qty: 90 TABLET | Refills: 1 | Status: SHIPPED | OUTPATIENT
Start: 2024-05-17

## 2024-05-17 NOTE — PROGRESS NOTES
SUBJECTIVE:  Kassandra Toth is a 69 y.o. female who presents for evaluation of hypertension and hyperlipidemia. She indicates that she is feeling well and denies any symptoms referable to her elevated blood pressure.   Specifically denies chest pain, palpitations, dyspnea, orthopnea, PND or peripheral edema.  No anorexia, arthralgia, or leg cramps noted. Current medication regimen is as listed below. She denies any side effects of medication, and has been taking it regularly.    Current Outpatient Medications   Medication Sig Dispense Refill    lisinopril (PRINIVIL;ZESTRIL) 10 MG tablet Take 1 tablet by mouth daily 90 tablet 1    nitrofurantoin, macrocrystal-monohydrate, (MACROBID) 100 MG capsule TAKE ONE CAPSULE BY MOUTH DAILY AS NEEDED FOR BLADDER INFECTION PREVENTION 30 capsule 1    ZINC PO Take by mouth      MAGNESIUM PO Take by mouth      Cholecalciferol (VITAMIN D3 PO) Take by mouth      acyclovir (ZOVIRAX) 400 MG tablet TAKE 1 TABLET BY MOUTH FIVE TIMES DAILY 30 tablet 0    Cyanocobalamin (VITAMIN B 12 PO) Take by mouth      SUMAtriptan (IMITREX) 100 MG tablet TAKE ONE TABLET BY MOUTH ONCE AS NEEDED FOR MIGRAINE 9 tablet 0    melatonin 3 MG TABS tablet Take 1 tablet by mouth daily      Calcium Citrate 150 MG CAPS Take by mouth       Multiple Vitamin (MULTIVITAMIN PO) Take  by mouth daily.       No current facility-administered medications for this visit.       Allergies   Allergen Reactions    Hctz Other (See Comments)     Low potassium       Social History     Tobacco Use    Smoking status: Former     Current packs/day: 0.00     Average packs/day: 0.8 packs/day for 15.0 years (11.3 ttl pk-yrs)     Types: Cigarettes     Start date: 1975     Quit date: 1990     Years since quittin.7    Smokeless tobacco: Never   Substance Use Topics    Alcohol use: Yes     Alcohol/week: 10.0 standard drinks of alcohol     Types: 10 Glasses of wine per week       OBJECTIVE:  S1 and S2 normal, no murmurs,

## 2024-05-17 NOTE — TELEPHONE ENCOUNTER
Kassandra Toth [phone] (home)    is requesting refill(s) of medication Lisinopril 10 mg Tablet to preferred pharmacy [facility]    Last OV 11/17/23 (pertaining to medication)   Last refill 11/17/23 (per medication requested)  Next office visit scheduled or attempted Yes  Date 05/17/24

## 2024-05-24 ENCOUNTER — HOSPITAL ENCOUNTER (OUTPATIENT)
Dept: WOMENS IMAGING | Age: 69
Discharge: HOME | End: 2024-05-24
Payer: MEDICARE

## 2024-05-24 VITALS — BODY MASS INDEX: 25.16 KG/M2 | WEIGHT: 142 LBS | HEIGHT: 63 IN

## 2024-05-24 DIAGNOSIS — Z12.31 SCREENING MAMMOGRAM FOR BREAST CANCER: ICD-10-CM

## 2024-05-24 PROCEDURE — 77063 BREAST TOMOSYNTHESIS BI: CPT

## 2024-09-30 ENCOUNTER — OFFICE VISIT (OUTPATIENT)
Dept: FAMILY MEDICINE CLINIC | Age: 69
End: 2024-09-30
Payer: MEDICARE

## 2024-09-30 VITALS
WEIGHT: 146.8 LBS | HEIGHT: 63 IN | SYSTOLIC BLOOD PRESSURE: 160 MMHG | DIASTOLIC BLOOD PRESSURE: 84 MMHG | OXYGEN SATURATION: 96 % | RESPIRATION RATE: 16 BRPM | HEART RATE: 80 BPM | BODY MASS INDEX: 26.01 KG/M2

## 2024-09-30 DIAGNOSIS — I10 ESSENTIAL HYPERTENSION: Primary | ICD-10-CM

## 2024-09-30 PROCEDURE — 3077F SYST BP >= 140 MM HG: CPT | Performed by: PHYSICIAN ASSISTANT

## 2024-09-30 PROCEDURE — G2211 COMPLEX E/M VISIT ADD ON: HCPCS | Performed by: PHYSICIAN ASSISTANT

## 2024-09-30 PROCEDURE — 99213 OFFICE O/P EST LOW 20 MIN: CPT | Performed by: PHYSICIAN ASSISTANT

## 2024-09-30 PROCEDURE — 3079F DIAST BP 80-89 MM HG: CPT | Performed by: PHYSICIAN ASSISTANT

## 2024-09-30 PROCEDURE — 1123F ACP DISCUSS/DSCN MKR DOCD: CPT | Performed by: PHYSICIAN ASSISTANT

## 2024-09-30 RX ORDER — SUMATRIPTAN 100 MG/1
TABLET, FILM COATED ORAL
Qty: 9 TABLET | Refills: 0 | Status: SHIPPED | OUTPATIENT
Start: 2024-09-30

## 2024-09-30 RX ORDER — LISINOPRIL 20 MG/1
20 TABLET ORAL DAILY
Qty: 30 TABLET | Refills: 1 | Status: SHIPPED | OUTPATIENT
Start: 2024-09-30

## 2024-09-30 ASSESSMENT — PATIENT HEALTH QUESTIONNAIRE - PHQ9
SUM OF ALL RESPONSES TO PHQ QUESTIONS 1-9: 0
SUM OF ALL RESPONSES TO PHQ9 QUESTIONS 1 & 2: 0
SUM OF ALL RESPONSES TO PHQ QUESTIONS 1-9: 0
1. LITTLE INTEREST OR PLEASURE IN DOING THINGS: NOT AT ALL
SUM OF ALL RESPONSES TO PHQ QUESTIONS 1-9: 0
2. FEELING DOWN, DEPRESSED OR HOPELESS: NOT AT ALL
SUM OF ALL RESPONSES TO PHQ QUESTIONS 1-9: 0

## 2024-09-30 NOTE — PROGRESS NOTES
Subjective:   Kassandra Toth is a 69 y.o. female with hypertension.  Current Outpatient Medications   Medication Sig Dispense Refill    lisinopril (PRINIVIL;ZESTRIL) 10 MG tablet TAKE 1 TABLET BY MOUTH DAILY 90 tablet 1    MAGNESIUM PO Take by mouth      Cholecalciferol (VITAMIN D3 PO) Take by mouth      acyclovir (ZOVIRAX) 400 MG tablet TAKE 1 TABLET BY MOUTH FIVE TIMES DAILY 30 tablet 0    Cyanocobalamin (VITAMIN B 12 PO) Take by mouth      SUMAtriptan (IMITREX) 100 MG tablet TAKE ONE TABLET BY MOUTH ONCE AS NEEDED FOR MIGRAINE 9 tablet 0    melatonin 3 MG TABS tablet Take 1 tablet by mouth daily      Calcium Citrate 150 MG CAPS Take by mouth       Multiple Vitamin (MULTIVITAMIN PO) Take  by mouth daily.      nitrofurantoin, macrocrystal-monohydrate, (MACROBID) 100 MG capsule TAKE ONE CAPSULE BY MOUTH DAILY AS NEEDED FOR BLADDER INFECTION PREVENTION (Patient not taking: Reported on 9/30/2024) 30 capsule 1    ZINC PO Take by mouth (Patient not taking: Reported on 9/30/2024)       No current facility-administered medications for this visit.      Hypertension ROS: taking medications as instructed, no medication side effects noted, no TIA's, no chest pain on exertion, no dyspnea on exertion, no swelling of ankles.   New concerns: BP has been running moni the last 2 weeks. She started yesterday taking lisinopirl 20mg daily instead of 10mg. .     Objective:   BP (!) 140/88   Pulse 80   Resp 16   Ht 1.6 m (5' 2.99\")   Wt 66.6 kg (146 lb 12.8 oz)   LMP  (LMP Unknown)   SpO2 96%   BMI 26.01 kg/m²    Appearance alert, well appearing, and in no distress, oriented to person, place, and time, and normal appearing weight.  General exam BP noted to be moderately elevated today in office, S1, S2 normal, no gallop, no murmur, chest clear, no JVD, no HSM, no edema.       Assessment/Plan:     Diagnosis Orders   1. Essential hypertension  Increase lisinopril to 20mg daily, ov 4-6 weeks for BP check and to bring her cuff

## 2024-10-14 NOTE — PROGRESS NOTES
Duoneb nebulizer given in office Azithormycin  inhaler  albuterol tessilon perles ; lungs clear post nebulizer    Orders:    azithromycin (ZITHROMAX) 250 mg tablet; Take 2 tablets today then 1 tablet daily x 4 days    benzonatate (TESSALON) 200 MG capsule; Take 1 capsule (200 mg total) by mouth 3 (three) times a day as needed for cough    albuterol (ProAir HFA) 90 mcg/act inhaler; Inhale 2 puffs every 4 (four) hours as needed for wheezing    ipratropium-albuterol (DUO-NEB) 0.5-2.5 mg/3 mL inhalation solution 3 mL     ADENOIDECTOMY         Family History   Problem Relation Age of Onset    Diabetes Father     Kidney Disease Father     High Blood Pressure Father     High Blood Pressure Mother     Other Mother         interstitial cystitis    Malig Hyperten Brother     No Known Problems Maternal Grandmother     No Known Problems Maternal Grandfather     No Known Problems Paternal Grandmother     No Known Problems Paternal Grandfather     No Known Problems Brother        CareTeam (Including outside providers/suppliers regularly involved in providing care):   Patient Care Team:  KENIA Nicholson as PCP - General (Physician Assistant)  Cliff Luz MD as PCP - OBGYN (Obstetrics & Gynecology)  KENIA Nicholson [facility]  Osmar Horton MD as Consulting Physician (Family Medicine)  Cliff Luz MD as Obstetrician (Obstetrics & Gynecology)    [facility] Readings from Last 3 Encounters:   11/05/21 148 lb 9.6 oz (67.4 kg)   05/03/21 149 lb (67.6 kg)   11/02/20 154 lb 9.6 oz (70.1 kg)     Vitals:    11/05/21 1046   BP: 138/84   Site: Right Upper Arm   Position: Sitting   Pulse: 89   SpO2: 98%   Weight: 148 lb 9.6 oz (67.4 kg)   Height: 5' 3\" (1.6 m)     Body mass index is 26.32 kg/m². Based upon direct observation of the patient, evaluation of cognition reveals recent and remote memory intact. General Appearance: alert and oriented to person, place and time, well-developed and well-nourished, in no acute distress  Pulmonary/Chest: clear to auscultation bilaterally- no wheezes, rales or rhonchi, normal air movement, no respiratory distress  Cardiovascular: normal rate, normal S1 and S2, no gallops, intact distal pulses and no carotid bruits    Patient's complete Health Risk Assessment and screening values have been reviewed and are found in Flowsheets. The following problems were reviewed today and where indicated follow up appointments were made and/or referrals ordered.     Positive Risk Factor Screenings with Interventions:          General Health and ACP:  General  In general, how would you say your health is?: Very Good  In the past 7 days, have you experienced any of the following?  New or Increased Pain, New or Increased Fatigue, Loneliness, Social Isolation, Stress or Anger?: None of These  Do you get the social and emotional support that you need?: Yes  Do you have a Living Will?: Yes  Advance Directives     Power of  Living Will ACP-Advance Directive ACP-Power of     Not on File Not on File Not on File Not on File      General Health Risk Interventions:  · No Living Will: will need a copy    Health Habits/Nutrition:  Health Habits/Nutrition  Do you exercise for at least 20 minutes 2-3 times per week?: (!) No  Have you lost any weight without trying in the past 3 months?: No  Do you eat only one meal per day?: No  Have you seen the dentist within the past year?: Yes  Body mass index: (!) 26.32  Health Habits/Nutrition Interventions:  · Inadequate physical activity:  patient agrees to exercise for at least 150 minutes/week     Safety:  Safety  Do you have working smoke detectors?: Yes  Have all throw rugs been removed or fastened?: (!) No  Do you have non-slip mats or surfaces in all bathtubs/showers?: (!) No  Do all of your stairways have a railing or banister?: Yes  Are your doorways, halls and stairs free of clutter?: Yes  Do you always fasten your seatbelt when you are in a car?: Yes  Safety Interventions:  · Home safety tips provided     Personalized Preventive Plan   Current Health Maintenance Status  Immunization History   Administered Date(s) Administered    SALLIEID-Caridad Anna Class, Primary or Immunocompromised, PF, 100mcg/0.5mL 03/24/2021, 04/21/2021    Hepatitis B (Recombivax HB) 12/05/2014, 01/07/2015, 07/07/2015    Influenza 10/01/2013    Influenza A (F0F3-24) Vaccine PF IM 01/14/2010    Influenza Vaccine, unspecified formulation 12/07/2012, 10/01/2013    Influenza Virus Vaccine 11/02/2011, 12/07/2012, 10/19/2016, 12/28/2018, 10/26/2019, 11/02/2020    Influenza Whole 10/31/2007, 09/25/2009    Influenza, MDCK Quadv, IM, PF (Flucelvax 2 yrs and older) 12/28/2018    Influenza, Meghan Roughen, IM, PF (6 mo and older Fluzone, Flulaval, Fluarix, and 3 yrs and older Afluria) 10/23/2015, 10/19/2016, 11/02/2020    Influenza, Meghan Roughen, Recombinant, IM PF (Flublok 18 yrs and older) 10/26/2019    PPD Test 12/05/2014, 12/15/2014    Tdap (Boostrix, Adacel) 08/13/2010    Zoster Live (Zostavax) 10/19/2016    Zoster Recombinant (Shingrix) 11/14/2019, 01/24/2020        Health Maintenance   Topic Date Due    Colon Cancer Screen FIT/FOBT  09/06/2019    DTaP/Tdap/Td vaccine (2 - Td or Tdap) 08/13/2020    Pneumococcal 65+ years Vaccine (1 of 1 - PPSV23) Never done   [employer] Visit (AWV)  Never done    Flu vaccine (1) 09/01/2021    Potassium monitoring  09/17/2021    Creatinine monitoring  09/17/2021    Breast cancer screen  08/17/2022    Cervical cancer screen  04/21/2023    Lipid screen  09/17/2025    DEXA (modify frequency per FRAX score)  Completed    Shingles Vaccine  Completed    COVID-19 Vaccine  Completed    Hepatitis C screen  Completed    HIV screen  Completed    Hepatitis A vaccine  Aged Out    Hepatitis B vaccine  Aged Out    Hib vaccine  Aged Out    Meningococcal (ACWY) vaccine  Aged Out     Recommendations for [place] Due: see orders and patient instructions/AVS.  . Recommended screening schedule for the next 5-10 years is provided to the patient in written form: see Patient Instructions/AVS.    Scottie Jay was seen today for hypertension and medicare awv.     Diagnoses and all orders for this visit:    Routine general medical examination at a health care facility

## 2024-10-23 ENCOUNTER — TELEPHONE (OUTPATIENT)
Dept: FAMILY MEDICINE CLINIC | Age: 69
End: 2024-10-23

## 2024-10-23 NOTE — TELEPHONE ENCOUNTER
Called pt to reschedule appointment on 11/18/24 since KENIA House is out of office. Pt states she has been taking 20 mg and 10 mg of Lisinopril. Pt is taking total of 30 mg Lisinopril daily. Pt states her average 3 days blood pressure reading was 160/91. Pt does not want to schedule appointment right now since her  passed away recently. Pt will call back to schedule 1 month follow up on hypertension.

## 2024-11-01 ENCOUNTER — OFFICE VISIT (OUTPATIENT)
Dept: FAMILY MEDICINE CLINIC | Age: 69
End: 2024-11-01

## 2024-11-01 VITALS
HEIGHT: 63 IN | OXYGEN SATURATION: 95 % | HEART RATE: 88 BPM | WEIGHT: 142.6 LBS | BODY MASS INDEX: 25.27 KG/M2 | RESPIRATION RATE: 16 BRPM | SYSTOLIC BLOOD PRESSURE: 134 MMHG | DIASTOLIC BLOOD PRESSURE: 86 MMHG

## 2024-11-01 DIAGNOSIS — I10 ESSENTIAL HYPERTENSION: Primary | ICD-10-CM

## 2024-11-01 DIAGNOSIS — G47.00 INSOMNIA, UNSPECIFIED TYPE: ICD-10-CM

## 2024-11-01 RX ORDER — LISINOPRIL 20 MG/1
30 TABLET ORAL DAILY
Qty: 30 TABLET | Refills: 1 | Status: SHIPPED
Start: 2024-11-01

## 2024-11-01 RX ORDER — TRAZODONE HYDROCHLORIDE 50 MG/1
50 TABLET, FILM COATED ORAL NIGHTLY PRN
Qty: 30 TABLET | Refills: 0 | Status: SHIPPED | OUTPATIENT
Start: 2024-11-01

## 2024-11-01 ASSESSMENT — PATIENT HEALTH QUESTIONNAIRE - PHQ9
SUM OF ALL RESPONSES TO PHQ QUESTIONS 1-9: 1
SUM OF ALL RESPONSES TO PHQ QUESTIONS 1-9: 1
SUM OF ALL RESPONSES TO PHQ9 QUESTIONS 1 & 2: 1
1. LITTLE INTEREST OR PLEASURE IN DOING THINGS: NOT AT ALL
SUM OF ALL RESPONSES TO PHQ QUESTIONS 1-9: 1
2. FEELING DOWN, DEPRESSED OR HOPELESS: SEVERAL DAYS
SUM OF ALL RESPONSES TO PHQ QUESTIONS 1-9: 1

## 2024-11-01 NOTE — PROGRESS NOTES
Subjective:   Kassandra Toth is a 69 y.o. female with hypertension.  Current Outpatient Medications   Medication Sig Dispense Refill    lisinopril (PRINIVIL;ZESTRIL) 20 MG tablet Take 1 tablet by mouth daily 30 tablet 1    SUMAtriptan (IMITREX) 100 MG tablet TAKE ONE TABLET BY MOUTH ONCE AS NEEDED FOR MIGRAINE 9 tablet 0    MAGNESIUM PO Take by mouth      Cholecalciferol (VITAMIN D3 PO) Take by mouth      acyclovir (ZOVIRAX) 400 MG tablet TAKE 1 TABLET BY MOUTH FIVE TIMES DAILY 30 tablet 0    Cyanocobalamin (VITAMIN B 12 PO) Take by mouth      melatonin 3 MG TABS tablet Take 1 tablet by mouth daily      Calcium Citrate 150 MG CAPS Take by mouth       Multiple Vitamin (MULTIVITAMIN PO) Take  by mouth daily.      ZINC PO Take by mouth (Patient not taking: Reported on 11/1/2024)       No current facility-administered medications for this visit.      Hypertension ROS: taking medications as instructed, no medication side effects noted, no TIA's, no chest pain on exertion, no dyspnea on exertion, no swelling of ankles.   New concerns:  diet on 10/20 and BP has been running higher since. She is not sleeping well.     Objective:   /86   Pulse 88   Resp 16   Ht 1.6 m (5' 2.99\")   Wt 64.7 kg (142 lb 9.6 oz)   LMP  (LMP Unknown)   SpO2 95%   BMI 25.27 kg/m²    Appearance alert, well appearing, and in no distress and oriented to person, place, and time.  General exam BP noted to be mildly elevated today in office, S1, S2 normal, no gallop, no murmur, chest clear, no JVD, no HSM, no edema.   Lab review: labs are reviewed, up to date and normal.     Assessment:/Plan:     Diagnosis Orders   1. Essential hypertension  Will increase lisinopril to 30mg daily. OV 4 weeks for recheck      2. Insomnia, unspecified type  Trial of trazodone

## 2024-11-29 RX ORDER — TRAZODONE HYDROCHLORIDE 50 MG/1
50 TABLET, FILM COATED ORAL NIGHTLY PRN
Qty: 30 TABLET | Refills: 0 | Status: SHIPPED | OUTPATIENT
Start: 2024-11-29

## 2024-11-29 NOTE — TELEPHONE ENCOUNTER
Kassandra Toth is requesting refill(s) trazodone  Last OV 11/1/24 (pertaining to medication)  LR 11/1/24 (per medication requested)  Next office visit scheduled or attempted Yes   If no, reason:  12/3/24

## 2024-11-30 SDOH — ECONOMIC STABILITY: FOOD INSECURITY: WITHIN THE PAST 12 MONTHS, YOU WORRIED THAT YOUR FOOD WOULD RUN OUT BEFORE YOU GOT MONEY TO BUY MORE.: NEVER TRUE

## 2024-11-30 SDOH — ECONOMIC STABILITY: TRANSPORTATION INSECURITY
IN THE PAST 12 MONTHS, HAS LACK OF TRANSPORTATION KEPT YOU FROM MEETINGS, WORK, OR FROM GETTING THINGS NEEDED FOR DAILY LIVING?: NO

## 2024-11-30 SDOH — ECONOMIC STABILITY: FOOD INSECURITY: WITHIN THE PAST 12 MONTHS, THE FOOD YOU BOUGHT JUST DIDN'T LAST AND YOU DIDN'T HAVE MONEY TO GET MORE.: NEVER TRUE

## 2024-11-30 SDOH — ECONOMIC STABILITY: INCOME INSECURITY: HOW HARD IS IT FOR YOU TO PAY FOR THE VERY BASICS LIKE FOOD, HOUSING, MEDICAL CARE, AND HEATING?: NOT HARD AT ALL

## 2024-12-03 ENCOUNTER — OFFICE VISIT (OUTPATIENT)
Dept: FAMILY MEDICINE CLINIC | Age: 69
End: 2024-12-03

## 2024-12-03 VITALS
WEIGHT: 142.2 LBS | HEIGHT: 63 IN | SYSTOLIC BLOOD PRESSURE: 142 MMHG | HEART RATE: 68 BPM | OXYGEN SATURATION: 97 % | DIASTOLIC BLOOD PRESSURE: 94 MMHG | BODY MASS INDEX: 25.2 KG/M2

## 2024-12-03 DIAGNOSIS — I10 ESSENTIAL HYPERTENSION: Primary | ICD-10-CM

## 2024-12-03 DIAGNOSIS — Z12.11 COLON CANCER SCREENING: ICD-10-CM

## 2024-12-03 RX ORDER — LISINOPRIL 30 MG/1
30 TABLET ORAL DAILY
Qty: 90 TABLET | Refills: 1 | Status: SHIPPED | OUTPATIENT
Start: 2024-12-03

## 2024-12-03 NOTE — PROGRESS NOTES
Subjective:   Kassandra Toth is a 69 y.o. female with hypertension.  Current Outpatient Medications   Medication Sig Dispense Refill    traZODone (DESYREL) 50 MG tablet TAKE ONE TABLET BY MOUTH ONCE NIGHTLY AS NEEDED FOR SLEEP 30 tablet 0    lisinopril (PRINIVIL;ZESTRIL) 20 MG tablet Take 1.5 tablets by mouth daily 30 tablet 1    SUMAtriptan (IMITREX) 100 MG tablet TAKE ONE TABLET BY MOUTH ONCE AS NEEDED FOR MIGRAINE 9 tablet 0    ZINC PO Take by mouth      MAGNESIUM PO Take by mouth      Cholecalciferol (VITAMIN D3 PO) Take by mouth      acyclovir (ZOVIRAX) 400 MG tablet TAKE 1 TABLET BY MOUTH FIVE TIMES DAILY 30 tablet 0    Cyanocobalamin (VITAMIN B 12 PO) Take by mouth      melatonin 3 MG TABS tablet Take 1 tablet by mouth daily      Calcium Citrate 150 MG CAPS Take by mouth       Multiple Vitamin (MULTIVITAMIN PO) Take  by mouth daily.       No current facility-administered medications for this visit.      Hypertension ROS: taking medications as instructed, no medication side effects noted, no TIA's, no chest pain on exertion, no dyspnea on exertion, no swelling of ankles.   New concerns: Bp's at home 120's/70's..     Objective:   BP (!) 142/100   Pulse 68   Ht 1.6 m (5' 2.99\")   Wt 64.5 kg (142 lb 3.2 oz)   LMP  (LMP Unknown)   SpO2 97%   BMI 25.20 kg/m²    Appearance alert, well appearing, and in no distress, oriented to person, place, and time, and normal appearing weight.  General exam BP noted to be mildly elevated today in office, S1, S2 normal, no gallop, no murmur, chest clear, no JVD, no HSM, no edema.       Assessment/Plan:     Diagnosis Orders   1. Essential hypertension  Continue lisinopril 30mg, monitor BP at home and call with results.       2. Colon cancer screening  Fecal DNA Colorectal cancer screening (Cologuard)

## 2024-12-18 LAB — NONINV COLON CA DNA+OCC BLD SCRN STL QL: NEGATIVE

## 2024-12-30 RX ORDER — TRAZODONE HYDROCHLORIDE 50 MG/1
50 TABLET, FILM COATED ORAL NIGHTLY PRN
Qty: 30 TABLET | Refills: 0 | Status: SHIPPED | OUTPATIENT
Start: 2024-12-30

## 2024-12-30 NOTE — TELEPHONE ENCOUNTER
Kassandra Toth is requesting refill(s) trazodone  Last OV 12/3/24 (pertaining to medication)  LR 11/29/24 (per medication requested)  Next office visit scheduled or attempted No   If no, reason:  pt due 6/3/24

## 2025-02-03 RX ORDER — TRAZODONE HYDROCHLORIDE 50 MG/1
50 TABLET, FILM COATED ORAL NIGHTLY PRN
Qty: 30 TABLET | Refills: 0 | Status: SHIPPED | OUTPATIENT
Start: 2025-02-03

## 2025-02-03 NOTE — TELEPHONE ENCOUNTER
Kassandra Toth is requesting refill(s) trazodone  Last OV 12/3/24 (pertaining to medication)  LR 12/30/24 (per medication requested)  Next office visit scheduled or attempted No   If no, reason:  pt due 6/3/25

## 2025-02-06 ENCOUNTER — TELEPHONE (OUTPATIENT)
Dept: FAMILY MEDICINE CLINIC | Age: 70
End: 2025-02-06

## 2025-02-20 NOTE — TELEPHONE ENCOUNTER
Pt comment: \"I’m going out of town on Saturday for a week and have 1 tablet left on last refill. Please refill\"      Kassandra Toth is requesting refill(s) trazodone  Last OV 12/3/24 (pertaining to medication)  LR 2/3/25 (per medication requested)  Next office visit scheduled or attempted No   If no, reason:  when do you want appt scheduled for?

## 2025-02-21 RX ORDER — TRAZODONE HYDROCHLORIDE 50 MG/1
50 TABLET ORAL NIGHTLY PRN
Qty: 30 TABLET | Refills: 0 | Status: SHIPPED | OUTPATIENT
Start: 2025-02-21

## 2025-03-04 SDOH — HEALTH STABILITY: PHYSICAL HEALTH: ON AVERAGE, HOW MANY DAYS PER WEEK DO YOU ENGAGE IN MODERATE TO STRENUOUS EXERCISE (LIKE A BRISK WALK)?: 2 DAYS

## 2025-03-04 SDOH — ECONOMIC STABILITY: FOOD INSECURITY: WITHIN THE PAST 12 MONTHS, THE FOOD YOU BOUGHT JUST DIDN'T LAST AND YOU DIDN'T HAVE MONEY TO GET MORE.: NEVER TRUE

## 2025-03-04 SDOH — ECONOMIC STABILITY: INCOME INSECURITY: IN THE LAST 12 MONTHS, WAS THERE A TIME WHEN YOU WERE NOT ABLE TO PAY THE MORTGAGE OR RENT ON TIME?: NO

## 2025-03-04 SDOH — ECONOMIC STABILITY: FOOD INSECURITY: WITHIN THE PAST 12 MONTHS, YOU WORRIED THAT YOUR FOOD WOULD RUN OUT BEFORE YOU GOT MONEY TO BUY MORE.: NEVER TRUE

## 2025-03-04 SDOH — HEALTH STABILITY: PHYSICAL HEALTH: ON AVERAGE, HOW MANY MINUTES DO YOU ENGAGE IN EXERCISE AT THIS LEVEL?: 120 MIN

## 2025-03-04 SDOH — ECONOMIC STABILITY: TRANSPORTATION INSECURITY
IN THE PAST 12 MONTHS, HAS THE LACK OF TRANSPORTATION KEPT YOU FROM MEDICAL APPOINTMENTS OR FROM GETTING MEDICATIONS?: NO

## 2025-03-04 ASSESSMENT — PATIENT HEALTH QUESTIONNAIRE - PHQ9
SUM OF ALL RESPONSES TO PHQ QUESTIONS 1-9: 1
2. FEELING DOWN, DEPRESSED OR HOPELESS: SEVERAL DAYS
1. LITTLE INTEREST OR PLEASURE IN DOING THINGS: NOT AT ALL

## 2025-03-04 ASSESSMENT — LIFESTYLE VARIABLES
HOW OFTEN DURING THE LAST YEAR HAVE YOU BEEN UNABLE TO REMEMBER WHAT HAPPENED THE NIGHT BEFORE BECAUSE YOU HAD BEEN DRINKING: NEVER
HAVE YOU OR SOMEONE ELSE BEEN INJURED AS A RESULT OF YOUR DRINKING: NO
HOW OFTEN DURING THE LAST YEAR HAVE YOU HAD A FEELING OF GUILT OR REMORSE AFTER DRINKING: NEVER
HOW MANY STANDARD DRINKS CONTAINING ALCOHOL DO YOU HAVE ON A TYPICAL DAY: 1 OR 2
HOW OFTEN DURING THE LAST YEAR HAVE YOU FAILED TO DO WHAT WAS NORMALLY EXPECTED FROM YOU BECAUSE OF DRINKING: NEVER
HOW OFTEN DURING THE LAST YEAR HAVE YOU FOUND THAT YOU WERE NOT ABLE TO STOP DRINKING ONCE YOU HAD STARTED: LESS THAN MONTHLY
HOW OFTEN DO YOU HAVE A DRINK CONTAINING ALCOHOL: 4 OR MORE TIMES A WEEK
HAS A RELATIVE, FRIEND, DOCTOR, OR ANOTHER HEALTH PROFESSIONAL EXPRESSED CONCERN ABOUT YOUR DRINKING OR SUGGESTED YOU CUT DOWN: NO
HOW OFTEN DURING THE LAST YEAR HAVE YOU NEEDED AN ALCOHOLIC DRINK FIRST THING IN THE MORNING TO GET YOURSELF GOING AFTER A NIGHT OF HEAVY DRINKING: NEVER

## 2025-03-07 ENCOUNTER — OFFICE VISIT (OUTPATIENT)
Dept: FAMILY MEDICINE CLINIC | Age: 70
End: 2025-03-07

## 2025-03-07 VITALS
WEIGHT: 149 LBS | RESPIRATION RATE: 16 BRPM | HEIGHT: 63 IN | DIASTOLIC BLOOD PRESSURE: 94 MMHG | SYSTOLIC BLOOD PRESSURE: 136 MMHG | HEART RATE: 73 BPM | OXYGEN SATURATION: 99 % | BODY MASS INDEX: 26.4 KG/M2

## 2025-03-07 DIAGNOSIS — F43.21 GRIEF REACTION: Primary | ICD-10-CM

## 2025-03-07 RX ORDER — ESCITALOPRAM OXALATE 5 MG/1
5 TABLET ORAL DAILY
Qty: 30 TABLET | Refills: 0 | Status: SHIPPED | OUTPATIENT
Start: 2025-03-07

## 2025-03-07 ASSESSMENT — PATIENT HEALTH QUESTIONNAIRE - PHQ9
2. FEELING DOWN, DEPRESSED OR HOPELESS: NEARLY EVERY DAY
SUM OF ALL RESPONSES TO PHQ QUESTIONS 1-9: 12
1. LITTLE INTEREST OR PLEASURE IN DOING THINGS: NEARLY EVERY DAY
SUM OF ALL RESPONSES TO PHQ QUESTIONS 1-9: 12
7. TROUBLE CONCENTRATING ON THINGS, SUCH AS READING THE NEWSPAPER OR WATCHING TELEVISION: SEVERAL DAYS
SUM OF ALL RESPONSES TO PHQ QUESTIONS 1-9: 12
5. POOR APPETITE OR OVEREATING: MORE THAN HALF THE DAYS
SUM OF ALL RESPONSES TO PHQ QUESTIONS 1-9: 12
3. TROUBLE FALLING OR STAYING ASLEEP: NOT AT ALL
9. THOUGHTS THAT YOU WOULD BE BETTER OFF DEAD, OR OF HURTING YOURSELF: NOT AT ALL
8. MOVING OR SPEAKING SO SLOWLY THAT OTHER PEOPLE COULD HAVE NOTICED. OR THE OPPOSITE, BEING SO FIGETY OR RESTLESS THAT YOU HAVE BEEN MOVING AROUND A LOT MORE THAN USUAL: NOT AT ALL
10. IF YOU CHECKED OFF ANY PROBLEMS, HOW DIFFICULT HAVE THESE PROBLEMS MADE IT FOR YOU TO DO YOUR WORK, TAKE CARE OF THINGS AT HOME, OR GET ALONG WITH OTHER PEOPLE: NOT DIFFICULT AT ALL
6. FEELING BAD ABOUT YOURSELF - OR THAT YOU ARE A FAILURE OR HAVE LET YOURSELF OR YOUR FAMILY DOWN: NOT AT ALL
4. FEELING TIRED OR HAVING LITTLE ENERGY: NEARLY EVERY DAY

## 2025-03-07 ASSESSMENT — ENCOUNTER SYMPTOMS: RESPIRATORY NEGATIVE: 1

## 2025-03-07 NOTE — PROGRESS NOTES
Subjective   Patient ID: Kassandra Toth is a 70 y.o. female.    HPI  Patient present with depression issues since   in October. She is not sleeping well, appetite is poor, feels tired all the time. Mood is sad all the time.   No history of depression.     Review of Systems   Constitutional: Negative.    Respiratory: Negative.     Cardiovascular: Negative.    Psychiatric/Behavioral:  Positive for dysphoric mood and sleep disturbance.           Objective   Physical Exam  Constitutional:       Appearance: Normal appearance.   Pulmonary:      Effort: Pulmonary effort is normal.   Neurological:      General: No focal deficit present.      Mental Status: She is alert and oriented to person, place, and time.            Assessment /Plan:     Diagnosis Orders   1. Grief reaction  Will start lexapro 5mg daily. OV 4 weeks              KENIA Robles

## 2025-03-11 ENCOUNTER — TELEMEDICINE (OUTPATIENT)
Dept: FAMILY MEDICINE CLINIC | Age: 70
End: 2025-03-11

## 2025-03-11 DIAGNOSIS — Z00.00 MEDICARE ANNUAL WELLNESS VISIT, SUBSEQUENT: Primary | ICD-10-CM

## 2025-03-11 SDOH — HEALTH STABILITY: PHYSICAL HEALTH: ON AVERAGE, HOW MANY MINUTES DO YOU ENGAGE IN EXERCISE AT THIS LEVEL?: 120 MIN

## 2025-03-11 SDOH — HEALTH STABILITY: PHYSICAL HEALTH: ON AVERAGE, HOW MANY DAYS PER WEEK DO YOU ENGAGE IN MODERATE TO STRENUOUS EXERCISE (LIKE A BRISK WALK)?: 2 DAYS

## 2025-03-11 ASSESSMENT — LIFESTYLE VARIABLES
HOW OFTEN DO YOU HAVE SIX OR MORE DRINKS ON ONE OCCASION: 2
HOW OFTEN DURING THE LAST YEAR HAVE YOU NEEDED AN ALCOHOLIC DRINK FIRST THING IN THE MORNING TO GET YOURSELF GOING AFTER A NIGHT OF HEAVY DRINKING: NEVER
HOW OFTEN DO YOU HAVE A DRINK CONTAINING ALCOHOL: 4 OR MORE TIMES A WEEK
HOW OFTEN DURING THE LAST YEAR HAVE YOU FAILED TO DO WHAT WAS NORMALLY EXPECTED FROM YOU BECAUSE OF DRINKING: NEVER
HOW OFTEN DURING THE LAST YEAR HAVE YOU NEEDED AN ALCOHOLIC DRINK FIRST THING IN THE MORNING TO GET YOURSELF GOING AFTER A NIGHT OF HEAVY DRINKING: NEVER
HOW OFTEN DURING THE LAST YEAR HAVE YOU BEEN UNABLE TO REMEMBER WHAT HAPPENED THE NIGHT BEFORE BECAUSE YOU HAD BEEN DRINKING: NEVER
HOW OFTEN DURING THE LAST YEAR HAVE YOU FOUND THAT YOU WERE NOT ABLE TO STOP DRINKING ONCE YOU HAD STARTED: LESS THAN MONTHLY
HOW OFTEN DURING THE LAST YEAR HAVE YOU FAILED TO DO WHAT WAS NORMALLY EXPECTED FROM YOU BECAUSE OF DRINKING: NEVER
HOW OFTEN DURING THE LAST YEAR HAVE YOU BEEN UNABLE TO REMEMBER WHAT HAPPENED THE NIGHT BEFORE BECAUSE YOU HAD BEEN DRINKING: NEVER
HAVE YOU OR SOMEONE ELSE BEEN INJURED AS A RESULT OF YOUR DRINKING: NO
HOW OFTEN DURING THE LAST YEAR HAVE YOU FOUND THAT YOU WERE NOT ABLE TO STOP DRINKING ONCE YOU HAD STARTED: LESS THAN MONTHLY
HOW MANY STANDARD DRINKS CONTAINING ALCOHOL DO YOU HAVE ON A TYPICAL DAY: 1
HOW OFTEN DURING THE LAST YEAR HAVE YOU HAD A FEELING OF GUILT OR REMORSE AFTER DRINKING: NEVER
HAVE YOU OR SOMEONE ELSE BEEN INJURED AS A RESULT OF YOUR DRINKING: NO
HOW OFTEN DURING THE LAST YEAR HAVE YOU HAD A FEELING OF GUILT OR REMORSE AFTER DRINKING: NEVER
HAS A RELATIVE, FRIEND, DOCTOR, OR ANOTHER HEALTH PROFESSIONAL EXPRESSED CONCERN ABOUT YOUR DRINKING OR SUGGESTED YOU CUT DOWN: NO
HOW OFTEN DO YOU HAVE A DRINK CONTAINING ALCOHOL: 5
HOW MANY STANDARD DRINKS CONTAINING ALCOHOL DO YOU HAVE ON A TYPICAL DAY: 1 OR 2
HAS A RELATIVE, FRIEND, DOCTOR, OR ANOTHER HEALTH PROFESSIONAL EXPRESSED CONCERN ABOUT YOUR DRINKING OR SUGGESTED YOU CUT DOWN: NO

## 2025-03-11 ASSESSMENT — PATIENT HEALTH QUESTIONNAIRE - PHQ9
1. LITTLE INTEREST OR PLEASURE IN DOING THINGS: NOT AT ALL
2. FEELING DOWN, DEPRESSED OR HOPELESS: SEVERAL DAYS
SUM OF ALL RESPONSES TO PHQ QUESTIONS 1-9: 1

## 2025-03-11 NOTE — PATIENT INSTRUCTIONS
9 Ways to Cut Back on Drinking  Maybe you've found yourself drinking more alcohol than you'd prefer. If you want to cut back, here are some ideas to try.    Think before you drink.  Do you really want a drink, or is it just a habit? If you're used to having a drink at a certain time, try doing something else then.     Look for substitutes.  Find some no-alcohol drinks that you enjoy, like flavored seltzer water, tea with honey, or tonic with a slice of lime. Or try alcohol-free beer or \"virgin\" cocktails (without the alcohol).     Drink more water.  Use water to quench your thirst. Drink a glass of water before you have any alcohol. Have another glass along with every drink or between drinks.     Shrink your drink.  For example, have a bottle of beer instead of a pint. Use a smaller glass for wine. Choose drinks with lower alcohol content (ABV%). Or use less liquor and more mixer in cocktails.     Slow down.  It's easy to drink quickly and without thinking about it. Pay attention, and make each drink last longer.     Do the math.  Total up how much you spend on alcohol each month. How much is that a year? If you cut back, what could you do with the money you save?     Take a break.  Choose a day or two each week when you won't drink at all. Notice how you feel on those days, physically and emotionally. How did you sleep? Do you feel better? Over time, add more break days.     Count calories.  Would you like to lose some weight? For some people that's a good motivator for cutting back. Figure out how many calories are in each drink. How many does that add up to in a day? In a week? In a month?     Practice saying no.  Be ready when someone offers you a drink. Try: \"Thanks, I've had enough.\" Or \"Thanks, but I'm cutting back.\" Or \"No, thanks. I feel better when I drink less.\"   Current as of: November 15, 2023  Content Version: 14.3  © 2024 [employer].   Care instructions adapted under license by [facility]

## 2025-03-11 NOTE — PROGRESS NOTES
Medicare Annual Wellness Visit    Kassandra Toth is here for Medicare AWV (Pt is here for annual medicare wellness visit. )    Assessment & Plan   Medicare annual wellness visit, subsequent     No follow-ups on file.     Subjective       Patient's complete Health Risk Assessment and screening values have been reviewed and are found in Flowsheets. The following problems were reviewed today and where indicated follow up appointments were made and/or referrals ordered.    Positive Risk Factor Screenings with Interventions:       Alcohol Screening:  AUDIT-C Score: (Patient-Rptd) 5  AUDIT Total Score: (Patient-Rptd) 5  Total Score Interpretation: 0-7 suggests low risk alcohol consumption but assess individual risks   Interventions:  Discussed cutting back on alcohol, can increase depression which she is already dealing with             General HRA Questions:  Select all that apply: (!) (Patient-Rptd) Loneliness, Stress  Interventions - Loneliness:  Patient comments: patient recently lost   Interventions - Stress:  Patient comments: recently lost       Inactivity:  On average, how many days per week do you engage in moderate to strenuous exercise (like a brisk walk)?: (Patient-Rptd) 2 days (!) Abnormal  On average, how many minutes do you engage in exercise at this level?: (Patient-Rptd) 120 min  Interventions:  Patient declined any further interventions or treatment                         Objective    Patient-Reported Vitals  No data recorded               Allergies   Allergen Reactions    Hctz Other (See Comments)     Low potassium     Prior to Visit Medications    Medication Sig Taking? Authorizing Provider   escitalopram (LEXAPRO) 5 MG tablet Take 1 tablet by mouth daily Yes Erin Cyr PA   lisinopril (PRINIVIL;ZESTRIL) 30 MG tablet Take 1 tablet by mouth daily Yes Erin Cyr PA   SUMAtriptan (IMITREX) 100 MG tablet TAKE ONE TABLET BY MOUTH ONCE AS NEEDED FOR MIGRAINE Yes Erin Cyr PA

## 2025-04-07 RX ORDER — ESCITALOPRAM OXALATE 5 MG/1
5 TABLET ORAL DAILY
Qty: 30 TABLET | Refills: 0 | Status: SHIPPED | OUTPATIENT
Start: 2025-04-07

## 2025-04-07 NOTE — TELEPHONE ENCOUNTER
Kassandra Toth There are no phone numbers on file.   is requesting refill(s) of medication Escitalopram 5 mg Tablet to preferred pharmacy [facility]    Last OV 03/07/25 (pertaining to medication)   Last refill 03/07/25 (per medication requested)  Next office visit scheduled or attempted Yes  Date 04/14/25

## 2025-04-14 ENCOUNTER — OFFICE VISIT (OUTPATIENT)
Dept: FAMILY MEDICINE CLINIC | Age: 70
End: 2025-04-14
Payer: MEDICARE

## 2025-04-14 VITALS
HEIGHT: 63 IN | SYSTOLIC BLOOD PRESSURE: 132 MMHG | DIASTOLIC BLOOD PRESSURE: 72 MMHG | HEART RATE: 69 BPM | WEIGHT: 136.8 LBS | BODY MASS INDEX: 24.24 KG/M2 | OXYGEN SATURATION: 97 %

## 2025-04-14 DIAGNOSIS — F43.20 GRIEF REACTION: Primary | ICD-10-CM

## 2025-04-14 PROCEDURE — 1123F ACP DISCUSS/DSCN MKR DOCD: CPT | Performed by: PHYSICIAN ASSISTANT

## 2025-04-14 PROCEDURE — 3078F DIAST BP <80 MM HG: CPT | Performed by: PHYSICIAN ASSISTANT

## 2025-04-14 PROCEDURE — 99213 OFFICE O/P EST LOW 20 MIN: CPT | Performed by: PHYSICIAN ASSISTANT

## 2025-04-14 PROCEDURE — 3075F SYST BP GE 130 - 139MM HG: CPT | Performed by: PHYSICIAN ASSISTANT

## 2025-04-14 PROCEDURE — 1159F MED LIST DOCD IN RCRD: CPT | Performed by: PHYSICIAN ASSISTANT

## 2025-04-14 PROCEDURE — 1160F RVW MEDS BY RX/DR IN RCRD: CPT | Performed by: PHYSICIAN ASSISTANT

## 2025-04-14 PROCEDURE — G2211 COMPLEX E/M VISIT ADD ON: HCPCS | Performed by: PHYSICIAN ASSISTANT

## 2025-04-14 RX ORDER — ESCITALOPRAM OXALATE 5 MG/1
5 TABLET ORAL DAILY
Qty: 90 TABLET | Refills: 1 | Status: SHIPPED | OUTPATIENT
Start: 2025-04-14

## 2025-04-14 ASSESSMENT — PATIENT HEALTH QUESTIONNAIRE - PHQ9
9. THOUGHTS THAT YOU WOULD BE BETTER OFF DEAD, OR OF HURTING YOURSELF: NOT AT ALL
4. FEELING TIRED OR HAVING LITTLE ENERGY: NOT AT ALL
SUM OF ALL RESPONSES TO PHQ QUESTIONS 1-9: 0
SUM OF ALL RESPONSES TO PHQ QUESTIONS 1-9: 0
3. TROUBLE FALLING OR STAYING ASLEEP: NOT AT ALL
SUM OF ALL RESPONSES TO PHQ QUESTIONS 1-9: 0
5. POOR APPETITE OR OVEREATING: NOT AT ALL
7. TROUBLE CONCENTRATING ON THINGS, SUCH AS READING THE NEWSPAPER OR WATCHING TELEVISION: NOT AT ALL
SUM OF ALL RESPONSES TO PHQ QUESTIONS 1-9: 0
2. FEELING DOWN, DEPRESSED OR HOPELESS: NOT AT ALL
10. IF YOU CHECKED OFF ANY PROBLEMS, HOW DIFFICULT HAVE THESE PROBLEMS MADE IT FOR YOU TO DO YOUR WORK, TAKE CARE OF THINGS AT HOME, OR GET ALONG WITH OTHER PEOPLE: NOT DIFFICULT AT ALL
8. MOVING OR SPEAKING SO SLOWLY THAT OTHER PEOPLE COULD HAVE NOTICED. OR THE OPPOSITE, BEING SO FIGETY OR RESTLESS THAT YOU HAVE BEEN MOVING AROUND A LOT MORE THAN USUAL: NOT AT ALL
6. FEELING BAD ABOUT YOURSELF - OR THAT YOU ARE A FAILURE OR HAVE LET YOURSELF OR YOUR FAMILY DOWN: NOT AT ALL
1. LITTLE INTEREST OR PLEASURE IN DOING THINGS: NOT AT ALL

## 2025-04-14 ASSESSMENT — ENCOUNTER SYMPTOMS
RESPIRATORY NEGATIVE: 1
GASTROINTESTINAL NEGATIVE: 1

## 2025-04-14 NOTE — PROGRESS NOTES
Subjective   Patient ID: Kassandra Toth is a 70 y.o. female.    HPI  Patient here for grief follow up. Started on lexapro and is doing well. Feels it has been helpful. Denies any side effects.       Review of Systems   Constitutional: Negative.    Respiratory: Negative.     Cardiovascular: Negative.    Gastrointestinal: Negative.    Genitourinary: Negative.           Objective   Physical Exam  Constitutional:       Appearance: Normal appearance. She is normal weight.   Pulmonary:      Effort: Pulmonary effort is normal.   Neurological:      General: No focal deficit present.      Mental Status: She is alert and oriented to person, place, and time.            Assessment /Plan:     Diagnosis Orders   1. Grief reaction  Improved on lexapro, continue 5mg dose and OV in 6 months                  KENIA Robles

## 2025-05-12 RX ORDER — TRAZODONE HYDROCHLORIDE 50 MG/1
TABLET ORAL
Qty: 90 TABLET | Refills: 0 | Status: SHIPPED | OUTPATIENT
Start: 2025-05-12

## 2025-05-12 NOTE — TELEPHONE ENCOUNTER
Kassandra Toth    is requesting refill(s) of medication Trazodone 50 mg Tablet to preferred pharmacy [facility]    Last OV 04/14/25 (pertaining to medication)   Last refill 02/21/25 (per medication requested)  Next office visit scheduled or attempted Yes  Lvm to schedule 6 months f/u in October 2025.

## 2025-05-13 ENCOUNTER — TELEPHONE (OUTPATIENT)
Dept: FAMILY MEDICINE CLINIC | Age: 70
End: 2025-05-13

## 2025-05-13 RX ORDER — ESCITALOPRAM OXALATE 10 MG/1
10 TABLET ORAL DAILY
Qty: 30 TABLET | Refills: 3 | Status: SHIPPED | OUTPATIENT
Start: 2025-05-13

## 2025-05-13 NOTE — TELEPHONE ENCOUNTER
----- Message from Eddie SALOMON sent at 5/13/2025  1:54 PM EDT -----  Regarding: E[facility] Message to Provider  E[facility] Message to Provider    Relationship to Patient: Self     Additional Information  Patient want the practice to call back for she wants to know if she need to get change for dosage of her prescription  --------------------------------------------------------------------------------------------------------------------------    Call Back Information: OK to leave message on voicemail  Preferred Call Back Number: Phone  [phone]

## 2025-05-13 NOTE — TELEPHONE ENCOUNTER
Patient would like to know if she can increase the Lexapro, she is currently on the 5mg would like to increase to 10mg, can do a virtual visit if needed. States she has just been having more down days.

## 2025-05-14 NOTE — TELEPHONE ENCOUNTER
Patient called back. I read Erin's note back to her. She is going to start the 10mg today. She has scheduled an appointment for Thursday 06/19/2025 for a med check with Erin

## 2025-06-04 RX ORDER — LISINOPRIL 30 MG/1
30 TABLET ORAL DAILY
Qty: 30 TABLET | Refills: 0 | Status: SHIPPED | OUTPATIENT
Start: 2025-06-04

## 2025-06-04 NOTE — TELEPHONE ENCOUNTER
30 day supply pended    Kassandra Toth is requesting refill(s) lisinopril  Last OV 12/3/24 (pertaining to medication)  LR 12/3/24 (per medication requested)  Next office visit scheduled or attempted Yes   If no, reason:  6/19/25

## 2025-06-19 ENCOUNTER — OFFICE VISIT (OUTPATIENT)
Dept: FAMILY MEDICINE CLINIC | Age: 70
End: 2025-06-19

## 2025-06-19 VITALS
OXYGEN SATURATION: 98 % | DIASTOLIC BLOOD PRESSURE: 62 MMHG | HEART RATE: 76 BPM | WEIGHT: 135.6 LBS | HEIGHT: 63 IN | SYSTOLIC BLOOD PRESSURE: 124 MMHG | BODY MASS INDEX: 24.03 KG/M2

## 2025-06-19 DIAGNOSIS — F43.20 GRIEF REACTION: Primary | ICD-10-CM

## 2025-06-19 DIAGNOSIS — B00.1 FEVER BLISTER: ICD-10-CM

## 2025-06-19 RX ORDER — VALACYCLOVIR HYDROCHLORIDE 500 MG/1
500 TABLET, FILM COATED ORAL 2 TIMES DAILY
Qty: 6 TABLET | Refills: 5 | Status: SHIPPED | OUTPATIENT
Start: 2025-06-19 | End: 2025-06-22

## 2025-06-19 ASSESSMENT — PATIENT HEALTH QUESTIONNAIRE - PHQ9
6. FEELING BAD ABOUT YOURSELF - OR THAT YOU ARE A FAILURE OR HAVE LET YOURSELF OR YOUR FAMILY DOWN: NOT AT ALL
SUM OF ALL RESPONSES TO PHQ QUESTIONS 1-9: 0
SUM OF ALL RESPONSES TO PHQ QUESTIONS 1-9: 0
5. POOR APPETITE OR OVEREATING: NOT AT ALL
8. MOVING OR SPEAKING SO SLOWLY THAT OTHER PEOPLE COULD HAVE NOTICED. OR THE OPPOSITE, BEING SO FIGETY OR RESTLESS THAT YOU HAVE BEEN MOVING AROUND A LOT MORE THAN USUAL: NOT AT ALL
7. TROUBLE CONCENTRATING ON THINGS, SUCH AS READING THE NEWSPAPER OR WATCHING TELEVISION: NOT AT ALL
4. FEELING TIRED OR HAVING LITTLE ENERGY: NOT AT ALL
10. IF YOU CHECKED OFF ANY PROBLEMS, HOW DIFFICULT HAVE THESE PROBLEMS MADE IT FOR YOU TO DO YOUR WORK, TAKE CARE OF THINGS AT HOME, OR GET ALONG WITH OTHER PEOPLE: NOT DIFFICULT AT ALL
SUM OF ALL RESPONSES TO PHQ QUESTIONS 1-9: 0
9. THOUGHTS THAT YOU WOULD BE BETTER OFF DEAD, OR OF HURTING YOURSELF: NOT AT ALL
SUM OF ALL RESPONSES TO PHQ QUESTIONS 1-9: 0
3. TROUBLE FALLING OR STAYING ASLEEP: NOT AT ALL
2. FEELING DOWN, DEPRESSED OR HOPELESS: NOT AT ALL
1. LITTLE INTEREST OR PLEASURE IN DOING THINGS: NOT AT ALL

## 2025-06-19 ASSESSMENT — ENCOUNTER SYMPTOMS: RESPIRATORY NEGATIVE: 1

## 2025-06-19 NOTE — PROGRESS NOTES
Subjective   Patient ID: Kassandra Toth is a 70 y.o. female.    HPI  Patient presents for follow up of depressed mood. She is doing better on the increased ose of lexapro. Denies any side effects.   Reports intermittent cold sores, has been on anti virals in the past as needed. Would like to get rx for this. Has flared the last few weeks.     Review of Systems   Constitutional: Negative.    Respiratory: Negative.     Cardiovascular: Negative.    Psychiatric/Behavioral: Negative.            Objective   Physical Exam  Constitutional:       Appearance: Normal appearance.   Pulmonary:      Effort: Pulmonary effort is normal.   Neurological:      General: No focal deficit present.      Mental Status: She is alert and oriented to person, place, and time.            Assessment /Plan:     Diagnosis Orders   1. Grief reaction  Improved on lexapro 10mg, continue current dose OV 6 months      2. Fever blister  Will start valtrex KENIA Kapoor

## 2025-07-07 RX ORDER — LISINOPRIL 30 MG/1
30 TABLET ORAL DAILY
Qty: 90 TABLET | Refills: 1 | Status: SHIPPED | OUTPATIENT
Start: 2025-07-07

## 2025-07-07 NOTE — TELEPHONE ENCOUNTER
Kassandra Toth [phone] (home)    is requesting refill(s) of medication Lisinopril to preferred pharmacy [facility]    Last OV 06/04/2025 (pertaining to medication)   Last refill 06/04/2025 (per medication requested)  Next office visit scheduled or attempted Yes  Date 12/19/2025  If No, reason 12/19/2025    Patient asked if she could have 90 day refills

## 2025-08-01 ENCOUNTER — HOSPITAL ENCOUNTER (OUTPATIENT)
Dept: WOMENS IMAGING | Age: 70
Discharge: HOME | End: 2025-08-01
Payer: MEDICARE

## 2025-08-01 VITALS — BODY MASS INDEX: 23.39 KG/M2 | HEIGHT: 63 IN | WEIGHT: 132 LBS

## 2025-08-01 DIAGNOSIS — Z12.31 BREAST CANCER SCREENING BY MAMMOGRAM: ICD-10-CM

## 2025-08-01 PROCEDURE — 77063 BREAST TOMOSYNTHESIS BI: CPT

## 2025-08-11 RX ORDER — TRAZODONE HYDROCHLORIDE 50 MG/1
TABLET ORAL
Qty: 90 TABLET | Refills: 1 | Status: SHIPPED | OUTPATIENT
Start: 2025-08-11